# Patient Record
Sex: FEMALE | Race: WHITE | URBAN - METROPOLITAN AREA
[De-identification: names, ages, dates, MRNs, and addresses within clinical notes are randomized per-mention and may not be internally consistent; named-entity substitution may affect disease eponyms.]

---

## 2017-01-17 DIAGNOSIS — Z78.0 POSTMENOPAUSAL ESTROGEN DEFICIENCY: ICD-10-CM

## 2017-01-17 DIAGNOSIS — Z12.39 SCREENING FOR BREAST CANCER: ICD-10-CM

## 2017-02-10 ENCOUNTER — TELEPHONE (OUTPATIENT)
Dept: ENDOCRINOLOGY | Age: 57
End: 2017-02-10

## 2017-02-10 NOTE — TELEPHONE ENCOUNTER
----- Message from Kashif Jovel sent at 2/10/2017 10:28 AM EST -----  Regarding: FW: Dr. Cayla Malone  See attached message. Patient needs a lab order before Monday. Thanks [employer]. ----- Message -----     From: Benjamin Santana     Sent: 2/10/2017   9:21 AM       To: e Front Office Pool  Subject: Dr. Cayla Malone                            Pt sated she sent [employer] message 4 days ago requesting for labwork(A1C) so she can go on Monday , and has not received a response. Pt would like a all. Best contact number [phone].

## 2017-03-31 ENCOUNTER — OFFICE VISIT (OUTPATIENT)
Dept: ENDOCRINOLOGY | Age: 57
End: 2017-03-31

## 2017-03-31 VITALS
SYSTOLIC BLOOD PRESSURE: 110 MMHG | OXYGEN SATURATION: 98 % | BODY MASS INDEX: 18.68 KG/M2 | HEIGHT: 67 IN | WEIGHT: 119 LBS | DIASTOLIC BLOOD PRESSURE: 74 MMHG | HEART RATE: 71 BPM

## 2017-03-31 DIAGNOSIS — E78.5 HYPERLIPIDEMIA LDL GOAL <100: ICD-10-CM

## 2017-03-31 DIAGNOSIS — R73.02 IMPAIRED GLUCOSE TOLERANCE: Primary | ICD-10-CM

## 2017-03-31 LAB — HBA1C MFR BLD HPLC: 5.2 %

## 2017-03-31 RX ORDER — SUVOREXANT 15 MG/1
TABLET, FILM COATED ORAL
COMMUNITY
Start: 2017-03-29 | End: 2017-03-31

## 2017-03-31 RX ORDER — DILTIAZEM HYDROCHLORIDE 30 MG/1
TABLET, FILM COATED ORAL
COMMUNITY
Start: 2017-03-04 | End: 2017-03-31

## 2017-03-31 RX ORDER — METFORMIN HYDROCHLORIDE 500 MG/1
500 TABLET, EXTENDED RELEASE ORAL
Qty: 30 TAB | Refills: 11 | Status: SHIPPED | OUTPATIENT
Start: 2017-03-31 | End: 2018-05-17 | Stop reason: SDUPTHER

## 2017-03-31 RX ORDER — VALACYCLOVIR HYDROCHLORIDE 1 G/1
500 TABLET, FILM COATED ORAL DAILY
COMMUNITY
Start: 2017-03-28

## 2017-03-31 RX ORDER — EAR PLUGS
EACH OTIC (EAR)
COMMUNITY
End: 2017-11-29

## 2017-03-31 NOTE — MR AVS SNAPSHOT
Visit Information Date & Time Provider Department Dept. Phone Encounter #  
 3/31/2017  3:30 PM Serg Lawson [facility] [phone] [id number] Follow-up Instructions Return in about 6 months (around 9/30/2017). Upcoming Health Maintenance Date Due Pneumococcal 19-64 Highest Risk (1 of 3 - PCV13) 4/21/1977 DTaP/Tdap/Td series (1 - Tdap) 4/21/1979 PAP AKA CERVICAL CYTOLOGY 4/21/1979 FOBT Q 1 YEAR AGE 50-75 4/21/2008 BREAST CANCER SCRN MAMMOGRAM 12/8/2018 Allergies as of 3/31/2017  Review Complete On: 3/31/2017 By: Susie Laguna MD  
 No Known Allergies Current Immunizations  Reviewed on 11/29/2016 Name Date Influenza Vaccine (Quad) PF 11/29/2016 Not reviewed this visit You Were Diagnosed With   
  
 Codes Comments Impaired glucose tolerance    -  Primary ICD-10-CM: R73.02 
ICD-9-CM: 790.22 Hyperlipidemia LDL goal <100     ICD-10-CM: E78.5 ICD-9-CM: 272.4 Vitals BP Pulse Height(growth percentile) Weight(growth percentile) SpO2 BMI  
 110/74 (BP 1 Location: Right arm, BP Patient Position: Sitting) 71 5' 7\" (1.702 m) 119 lb (54 kg) 98% 18.64 kg/m2 OB Status Smoking Status Menopause Never Smoker Vitals History BMI and BSA Data Body Mass Index Body Surface Area  
 18.64 kg/m 2 1.6 m 2 Preferred Pharmacy Pharmacy Name Phone [facility] [address] [phone] Your Updated Medication List  
  
   
This list is accurate as of: 3/31/17  5:00 PM.  Always use your most recent med list.  
  
  
  
  
 aspirin 325 mg tablet Commonly known as:  ASPIRIN Take 325 mg by mouth daily. atorvastatin 10 mg tablet Commonly known as:  LIPITOR Take 5 mg by mouth daily. BELSOMRA 5 mg tablet Generic drug:  suvorexant Take  by mouth nightly as needed for Insomnia. calcium carbonate 600 mg calcium (1,500 mg) tablet Commonly known as:  Merline Salle Take 600 mg by mouth daily. Calcium-Vitamin D3-Vitamin K 450-104-77 mg-unit-mcg Chew Take  by mouth. CO Q-10 100 mg capsule Generic drug:  co-enzyme Q-10 Take 100 mg by mouth daily. fish oil-dha-epa 1,200-144-216 mg Cap Take 2 Tabs by mouth daily. Glucosamine &Chondroit-MV-Min3 024-746-33-0.5 mg Tab Take  by mouth.  
  
 magnesium oxide 500 mg Tab Take 500 mg by mouth daily. metFORMIN  mg tablet Commonly known as:  GLUCOPHAGE XR Take 1 Tab by mouth daily (with dinner). multivitamin tablet Commonly known as:  ONE A DAY Take 1 Tab by mouth daily. raNITIdine 300 mg tablet Commonly known as:  ZANTAC Take 300 mg by mouth two (2) times a day. valACYclovir 1 gram tablet Commonly known as:  VALTREX  
500 mg. VITAMIN D3 2,000 unit Tab Generic drug:  cholecalciferol (vitamin D3) Take  by mouth daily. Prescriptions Printed Refills  
 metFORMIN ER (GLUCOPHAGE XR) 500 mg tablet 11 Sig: Take 1 Tab by mouth daily (with dinner). Class: Print Route: Oral  
  
We Performed the Following AMB POC HEMOGLOBIN A1C [79288 CPT(R)] HEMOGLOBIN A1C WITH EAG [08822 CPT(R)] METABOLIC PANEL, BASIC [53954 CPT(R)] NMR LIPOPROFILE [id number] CPT(R)] Follow-up Instructions Return in about 6 months (around 9/30/2017). Patient Instructions 1) Your Hemoglobin A1c (3 month test of blood sugar) is now well within the normal range at 5.2%. 2) If you would like to fill the metformin and take one tab with dinner or after dinner to see if this has any effect on your nerve symptoms, you are welcome to fill this. The most common side effects are nausea, diarrhea and belly pain and do tend to improve within 3-7 days of continuing the dose. 3) Introducing [facility]! Dear Oscar Flores: Thank you for requesting a [employer] account. Our records indicate that you already have an active [employer] account. You can access your account anytime at [email or web address] Did you know that you can access your hospital and ER discharge instructions at any time in [employer]? You can also review all of your test results from your hospital stay or ER visit. Additional Information If you have questions, please visit the Frequently Asked Questions section of the [employer] website at [email or web address] Remember, [employer] is NOT to be used for urgent needs. For medical emergencies, dial 911. Now available from your iPhone and Android! Please provide this summary of care documentation to your next provider. Your primary care clinician is listed as [facility]. If you have any questions after today's visit, please call [phone].

## 2017-03-31 NOTE — PROGRESS NOTES
Chief Complaint   Patient presents with    Diabetes    Other     pcp and pharmacy confirmed      History of Present Illness: Imani Ordonez is a 60 y.o. female here for follow up of diabetes. Weight up 4 lbs since last visit in . She tried taking 1/4 of a 10 mg atorva after last visit for about 3 months to see if this has any effect on the CK but it didn't and it was still in the mid-300s and her LDL went back up so she went back up to 1/2 tab daily. She has stopped the gabapentin as this didn't help the numbness in her feet. She does do some weight training and has been working out as much as 3 times a week and didn't seem to have any difference in her CK on this frequency vs only once a week working out. It turns out to be higher now working out once a week than it had been previously at 3 times a week. After last visit, checked her sugar up to 10 times a day for 3 weeks to see what was happening and started paying attention to her carb intake and was eating as much as 75-80 grams per meal and saw some spikes as high as 180 after a meal that could occur as soon as 30 min after eating. Saw that potatoes and pizza were really spiking her. Started watching her carb intake much more closely and currently is eating about 28 g for breakfast, 17 for a mid morning and afternoon snack and 25-30 for lunch and 26 for dinner and about 15 g for dessert. Despite watching her carb intake has not seen any significant improvement in neuropathy symptoms. We discussed a trial of metformin to see if this would have any effect on her neuropathy and she wants to think about this. She has been told her B12 level was high in the past so probably won't benefit from adding a b-complex vitamin to her regimen. Current Outpatient Prescriptions   Medication Sig    valACYclovir (VALTREX) 1 gram tablet 500 mg.  Calcium-Vitamin D3-Vitamin K 463440-25 mg-unit-mcg chew Take  by mouth.     co-enzyme Q-10 (CO Q-10) 100 mg capsule Take 100 mg by mouth daily.  Glucosamine &Chondroit-MV-Min3 952-157-90-0.5 mg tab Take  by mouth.  raNITIdine (ZANTAC) 300 mg tablet Take 300 mg by mouth two (2) times a day.  multivitamin (ONE A DAY) tablet Take 1 Tab by mouth daily.  aspirin (ASPIRIN) 325 mg tablet Take 325 mg by mouth daily.  magnesium oxide 500 mg tab Take 500 mg by mouth daily.  cholecalciferol, vitamin D3, (VITAMIN D3) 2,000 unit tab Take  by mouth daily.  calcium carbonate (CALTREX) 600 mg (1,500 mg) tablet Take 600 mg by mouth daily.  fish oil-dha-epa 1,200-144-216 mg cap Take 2 Tabs by mouth daily.  atorvastatin (LIPITOR) 10 mg tablet Take 5 mg by mouth daily. No current facility-administered medications for this visit. No Known Allergies     Review of Systems:  - Eyes: no blurry vision or double vision  - Cardiovascular: no chest pain  - Respiratory: no shortness of breath  - Musculoskeletal: (+) feet pain from metatarsalgia  - Neurological: (+) numbness in feet    Physical Examination:  Blood pressure 110/74, pulse 71, height 5' 7\" (1.702 m), weight 119 lb (54 kg), SpO2 98 %. - General: pleasant, no distress, good eye contact   - Neck: no carotid bruits  - Cardiovascular: regular, normal rate, nl s1 and s2, no m/r/g,   - Respiratory: clear bilaterally  - Integumentary: no edema,   - Psychiatric: normal mood and affect    Data Reviewed:   Component      Latest Ref Rng & Units 3/31/2017           4:36 PM   Hemoglobin A1c (POC)      % 5.2       Assessment/Plan:     1. Impaired glucose tolerance: Has had mildly elevated A1c levels since early 2015 when it was 5.9% and then 5.5% in 8/15, 5.9% in 4/16 and 5.5% in 8/16. Had a 3 hour glucose tolerance tolerance test in 11/15 that showed a fasting sugar of 80, 1 hour of 121, 2 hour of 55 and 3 hour of 49. Her A1c was 5.6% at [address] when I met her in 11/16.   It was unclear if she could be having intermittent spikes in her glucose that are contributing to her peripheral neuropathy so she started testing her sugars after eating and did identify spikes so has cut back to 30g of carbs or less per meal and 15-17g for snacks. Her A1c is down to 5.2% in 3/17 by POC and despite this , is still having numbness in her feet. She will consider metformin to see if this may help in any way and gave her a prescription to hold onto though I'm not convinced this will give her much benefit given her A1c is now well within the normal range. - consider Metformin  mg with dinner  - check A1c and bmp prior to next visit      2. Hyperlipidemia LDL goal <100: LDL 68 and  in 11/16 while taking atorva 5 mg and she cut back to 2.5 mg daily to see if this would help her CK and it didn't and her LDL apparently went higher when checked by her cardiologist so she has gone back to 5 mg daily. - cont atorva 5 mg daily  - check NMR lipid profile prior to next visit        We spent 25 minutes of face to face time together and > 50% of the time was spent in counseling regarding management of the conditions above. Patient Instructions   1) Your Hemoglobin A1c (3 month test of blood sugar) is now well within the normal range at 5.2%. 2) If you would like to fill the metformin and take one tab with dinner or after dinner to see if this has any effect on your nerve symptoms, you are welcome to fill this. The most common side effects are nausea, diarrhea and belly pain and do tend to improve within 3-7 days of continuing the dose. 3) Please go to your local [facility] 3-4 days before your next appointment to have your labs drawn. Follow-up Disposition:  Return in about 6 months (around 9/30/2017).     Copy sent to:  Dr. Anna Marie Camacho via [facility]   Dr. Priscille Bosworth in [address] Dr. Ephraim Homans in [place] MD

## 2017-03-31 NOTE — PATIENT INSTRUCTIONS
1) Your Hemoglobin A1c (3 month test of blood sugar) is now well within the normal range at 5.2%. 2) If you would like to fill the metformin and take one tab with dinner or after dinner to see if this has any effect on your nerve symptoms, you are welcome to fill this. The most common side effects are nausea, diarrhea and belly pain and do tend to improve within 3-7 days of continuing the dose. 3) Please go to your local [facility] 3-4 days before your next appointment to have your labs drawn.

## 2017-05-02 ENCOUNTER — OFFICE VISIT (OUTPATIENT)
Dept: NEUROLOGY | Age: 57
End: 2017-05-02

## 2017-05-02 VITALS
OXYGEN SATURATION: 99 % | HEART RATE: 81 BPM | DIASTOLIC BLOOD PRESSURE: 78 MMHG | WEIGHT: 118.7 LBS | BODY MASS INDEX: 18.59 KG/M2 | SYSTOLIC BLOOD PRESSURE: 110 MMHG

## 2017-05-02 DIAGNOSIS — R20.0 NUMBNESS OF FINGERS OF BOTH HANDS: Primary | ICD-10-CM

## 2017-05-02 DIAGNOSIS — R53.1 WEAKNESS: ICD-10-CM

## 2017-05-02 DIAGNOSIS — G47.00 INSOMNIA, UNSPECIFIED TYPE: ICD-10-CM

## 2017-05-02 DIAGNOSIS — G62.9 NEUROPATHY: ICD-10-CM

## 2017-05-02 RX ORDER — DOXEPIN HYDROCHLORIDE 3 MG/1
3 TABLET ORAL DAILY
Qty: 4 TAB | Refills: 0 | Status: SHIPPED | COMMUNITY
Start: 2017-05-02 | End: 2017-09-28

## 2017-05-02 RX ORDER — DOXEPIN HYDROCHLORIDE 6 MG/1
6 TABLET ORAL DAILY
Qty: 4 TAB | Refills: 0 | Status: SHIPPED | COMMUNITY
Start: 2017-05-02 | End: 2017-09-28

## 2017-05-02 NOTE — PROGRESS NOTES
NEUROLOGY NEW PATIENT CONSULTATION    REFERRED BY:  Sudhakar Mckeon MD    CHIEF COMPLAINT:  Bilateral numbness in feet  Numbness in both hands at night  Dropping objects in hands    HISTORY OF PRESENT ILLNESS    HISTORY PROVIDED BY:  Patient      Donte Manuel is a 59 y.o. female who I am asked to see in consultation for numbness in hands and feet and dropping objects. Patient is a . Currently she works for the [employer]. She previously lived in [place] and has recently moved to this area. When she was in [place] she had extensive evaluation for her complaints at [facility]. However, at that time she was only having symptoms in the feet. Today she has a new complaint of numbness in the hands and dropping objects. Dr. Juli Jackson did provide her previous records from her neurologist and her [facility] evaluation. This included MRI of the brain that was normal.  Initial EMG of the legs that showed large fiber neuropathy. Follow-up EMG/NCS of the legs at [facility] that was negative for any large fiber neuropathy. ANS testing that was negative. Skin biopsy that was negative for small fiber neuropathy. Multiple lab tests for neuropathy including Lyme, B12, hemoglobin A1c, TSH, SPEP, LUIS ANTONIO, copper level, B1, vitamin D, and rheumatoid factor. She also had genetic testing for CMT. She also had the [place] paraneoplastic panel and kappa/lambda light chain serum levels drawn, and amyloidosis TTR eval. All of these were negative. Patient reports that her symptoms initially started 2 years ago. She had some numbness and pain in the balls of her feet. She underwent multiple evaluations with orthopedics, neurology, rheumatology, etc.  Between these physicians it was determined that she had the likely diagnosis of small fiber neuropathy secondary to glucose spikes as well as a separate issue of left foot metatarsalgia. 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       She has increased sensitivity in her feet and is hypersensitive. This has been crippling to her. She was told she has dropped metatarsals. She has a pressure point on her feet. She can't exercise, walk, and travel. 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 Thus she now works for the [employer]. She notes the neuropathy and metatarsalgia have a temporal relationship which is concerning to her. She has to wear special shoes with zero heel to remotely function. In regards to the erythromelalgia she has a demarcation of color in the feet. It is affected by temperature. She does not have the associated pain. Therefore she does question if this is an accurate diagnosis. She has had extensive vascular studies which have all been negative for any vascular issue causing the problem. She does get some issues in the hands but not extensively. She does have premature atherosclerosis. She was on a statin and did a trial off of this but didn't see change so she went back on the meds. She has insulin resistance and is preDM. She follows with endocrine and has now changed her diet. Her most recent HgBA1c was 5.2. She also has some slight numbness of the hands and some redness of the hands. She has never been diagnosed with CTS but will wake up with numbness. She is dropping items all day long unless she concentrates on gripping items. She couldn't tolerate amitriptyline due to constipation. She doesn't feel like gabapentin really helped. She is having memory issues. She notes that her memory is not like it used to be, but she is concerned this may be secondary to her not sleeping well. She also has insomnia and has failed meds previously. She has tried melatonin but not in high doses.     PMH  Past Medical History:   Diagnosis Date    Carotid disease, bilateral ([address]     increased carotid intima media thickness    Colon polyps     Coronary artery disease     GERD (gastroesophageal reflux disease)     Herpesviral meningitis 2008    Neuropathy     Osteopenia     PAC (premature atrial contraction)     Pre-diabetes     SV[facility]ra[facility]ricular tachycardia) ([facility])        SH  Social History     Social History    Marital status: SINGLE     Spouse name: N/A    Number of children: N/A    Years of education: N/A     Social History Main Topics    Smoking status: Never Smoker    Smokeless tobacco: Never Used    Alcohol use Yes      Comment: rarely    Drug use: No    Sexual activity: No     Other Topics Concern    None     Social History Narrative    Lives in [place] alone. No children. 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 Likes to care for animals. FH  Family History   Problem Relation Age of Onset    Diabetes Mother 61    Alzheimer Mother     Cancer Father      lung    Heart Disease Father     Stroke Brother        ALLERGIES  No Known Allergies    CURRENT MEDS  Current Outpatient Prescriptions   Medication Sig Dispense Refill    valACYclovir (VALTREX) 1 gram tablet 500 mg.  Calcium-Vitamin D3-Vitamin K 059-483-49 mg-unit-mcg chew Take  by mouth.  co-enzyme Q-10 (CO Q-10) 100 mg capsule Take 100 mg by mouth daily.  Glucosamine &Chondroit-MV-Min3 046-216-11-0.5 mg tab Take  by mouth.  multivitamin (ONE A DAY) tablet Take 1 Tab by mouth daily.  aspirin (ASPIRIN) 325 mg tablet Take 325 mg by mouth daily.  magnesium oxide 500 mg tab Take 500 mg by mouth daily.  cholecalciferol, vitamin D3, (VITAMIN D3) 2,000 unit tab Take  by mouth daily.  calcium carbonate (CALTREX) 600 mg (1,500 mg) tablet Take 600 mg by mouth daily.  fish oil-dha-epa 1,200-144-216 mg cap Take 2 Tabs by mouth daily.  atorvastatin (LIPITOR) 10 mg tablet Take 5 mg by mouth daily.  suvorexant (BELSOMRA) 5 mg tablet Take  by mouth nightly as needed for Insomnia.  metFORMIN ER (GLUCOPHAGE XR) 500 mg tablet Take 1 Tab by mouth daily (with dinner).  30 Tab 11    raNITIdine (ZANTAC) 300 mg tablet Take 300 mg by mouth two (2) times a day. REVIEW OF SYSTEMS:     Y  N       Y  N  Y  N   Y  N  [] [x] AIDS          [] [x] Falls  [x] [] Memory Loss  [] [x]  Shortness of breath  [x] [] Anxiety          [x] [] Fatigue [] [x] Muscle Pain        [x] []  Skipped beats  [] [x] Chest Pain   [x] [] Frequent HA [] [x] Ms Weakness     [] [x]  Snoring  [] [x] Constipation [] [x]Hearing loss [] [x] Nause/Vomiting  [] [x]  Stomach Pain  [] [x] Cough          [] [x]Hepatitis [x] [] Neuropathy         [] [x]  Swallowing difficulty  [] [x] Depression  [] [x]Incontinence [] [x] Poor appetite      [] [x]  Vertigo  [] [x] Diarrhea       [] [x] Joint Pain [] [x] Rash                   [] [x]  Visual disturbances  [] [x] Fainting        [] [x] Leg Swelling [] [x] Ringing ears       [] [x]  Weight changes      []Unable to obtain  ROS due to  []mental status change  []sedated   []intubated          PREVIOUS WORKUP  IMAGING: MRI brain: Negative      LABS  Results for orders placed or performed in visit on 03/31/17   AMB POC HEMOGLOBIN A1C   Result Value Ref Range    Hemoglobin A1c (POC) 5.2 %       PHYSICAL EXAM  Visit Vitals    /78    Pulse 81    Wt 53.8 kg (118 lb 11.2 oz)    SpO2 99%    BMI 18.59 kg/m2     General:  Alert, cooperative, no distress. Head:  Normocephalic, without obvious abnormality, atraumatic. Eyes:  Conjunctivae/corneas clear. Pupils equal, round, reactive to light. Extraocular movements intact, VFF, NO papilledema   Lungs:  Heart:   Non labored breathing  Regular rate and rhythm, no carotid bruits   Abdomen:   Soft, non-distended   Extremities: Extremities normal, atraumatic, no cyanosis or edema. Pulses: 2+ and symmetric all extremities. Skin: Skin color, texture, turgor normal. No rashes or lesions.    Neurologic:  Gen: Attention normal             Language: naming, repetition, fluency normal             Memory: intact recent and remote memory  Cranial Nerves:  I: smell Not tested   II: visual fields Full to confrontation   II: pupils Equal, round, reactive to light   II: optic disc No papilledema   III,VII: ptosis none   III,IV,VI: extraocular muscles  Full ROM   V: mastication normal   V: facial light touch sensation  normal   VII: facial muscle function   symmetric   VIII: hearing symmetric   IX: soft palate elevation  normal   XI: trapezius strength  5/5   XI: sternocleidomastoid strength 5/5   XI: neck flexion strength  5/5   XII: tongue  midline     Motor: normal bulk and tone, no tremor              Strength: 5/5 all four extremities  Sensory: Decreased pinprick in right ankle and decreased proprioception in right great toe otherwise intact to pinprick, vibration, temperature and other extremities  Coordination: FTN intact, Rhomberg positive  Gait: normal gait but unable to tandem  Reflexes: 2+ throughout       [address] Barney Gramajo is a 59 y.o. female who presents for evaluation of numbness in the feet and hands. She has had a previous diagnosis of small fiber neuropathy. I suspect this could be her issue and exam seems to be improving with improvement of her hemoglobin A1c. Encouraged her to continue aggressive blood sugar control and diet. Given her other symptoms of metatarsalgia and erythromelalgia I would like to pursue spinal imaging. Will do an MRI of the cervical and lumbar spine. At this point patient does not want to try any medications for the neuropathy. She is however having significant insomnia, so we will try medication for this. RECOMMENDATIONS  1. MRI C-spine and lumbar spine reviewed all labs from  [address] [place] [address] and the neurology center. These are available in the media tab  3. We will try Silenor samples 3 mg and 6 mg for insomnia  4. No further labs needed at this point  5. Encouraged patient to do CTS exercises and wear wrist splints at night  6.   May need to pursue repeat EMG/NCS and include the arms for evaluation of CTS    FU 3 months    Naeem Cooley MD    CC: Leticia Urrutia MD  Fax: [phone]    Over 60 minutes was spent with the patient of which > 50% of the visit was spent counseling on diagnosis, management, and treatment of the diagnosis neuropathy    This note was created using voice recognition software. Despite editing, there may be syntax errors. This note will not be viewable in [address]

## 2017-05-02 NOTE — MR AVS SNAPSHOT
Visit Information Date & Time Provider Department Dept. Phone Encounter #  
 5/2/2017  2:00 PM Sara Hernández MD Neurology [facility] [place] [facility] [phone] [id number] Your Appointments 9/29/2017  1:30 PM  
Follow Up with MD Nilesh Guzman Diabetes and Endocrinology [facility]) Appt Note: 6 month f/u  
 [address] [facility] [address] [id number] [address] [facility] Upcoming Health Maintenance Date Due DTaP/Tdap/Td series (1 - Tdap) 4/21/1979 PAP AKA CERVICAL CYTOLOGY 4/21/1979 FOBT Q 1 YEAR AGE 50-75 4/21/2008 INFLUENZA AGE 9 TO ADULT 8/1/2017 BREAST CANCER SCRN MAMMOGRAM 12/8/2018 Allergies as of 5/2/2017  Review Complete On: 5/2/2017 By: Pina Roles No Known Allergies Current Immunizations  Reviewed on 11/29/2016 Name Date Influenza Vaccine (Quad) PF 11/29/2016 Not reviewed this visit You Were Diagnosed With   
  
 Codes Comments Numbness of fingers of both hands    -  Primary ICD-10-CM: R20.0 ICD-9-CM: 782.0 Weakness     ICD-10-CM: R53.1 ICD-9-CM: 780.79 Insomnia, unspecified type     ICD-10-CM: G47.00 ICD-9-CM: 780.52 Neuropathy     ICD-10-CM: G62.9 ICD-9-CM: 552. 9 Vitals BP Pulse Weight(growth percentile) SpO2 BMI OB Status 110/78 81 118 lb 11.2 oz (53.8 kg) 99% 18.59 kg/m2 Menopause Smoking Status Never Smoker BMI and BSA Data Body Mass Index Body Surface Area 18.59 kg/m 2 1.59 m 2 Preferred Pharmacy Pharmacy Name Phone [facility] [address] [address] [phone] Your Updated Medication List  
  
   
This list is accurate as of: 5/2/17  3:07 PM.  Always use your most recent med list.  
  
  
  
  
 aspirin 325 mg tablet Commonly known as:  ASPIRIN Take 325 mg by mouth daily. atorvastatin 10 mg tablet Commonly known as:  LIPITOR Take 5 mg by mouth daily. BELSOMRA 5 mg tablet Generic drug:  suvorexant Take  by mouth nightly as needed for Insomnia. calcium carbonate 600 mg calcium (1,500 mg) tablet Commonly known as:  Refugia Priestly Take 600 mg by mouth daily. Calcium-Vitamin D3-Vitamin K 102-242-27 mg-unit-mcg Chew Take  by mouth. CO Q-10 100 mg capsule Generic drug:  co-enzyme Q-10 Take 100 mg by mouth daily. * Doxepin 3 mg Tab Commonly known as:  Adonis Schwab Take 3 mg by mouth daily. * Doxepin 6 mg Tab Commonly known as:  Adonis Schwab Take 6 mg by mouth daily. fish oil-dha-epa 1,200-144-216 mg Cap Take 2 Tabs by mouth daily. Glucosamine &Chondroit-MV-Min3 360-191-80-0.5 mg Tab Take  by mouth.  
  
 magnesium oxide 500 mg Tab Take 500 mg by mouth daily. metFORMIN  mg tablet Commonly known as:  GLUCOPHAGE XR Take 1 Tab by mouth daily (with dinner). multivitamin tablet Commonly known as:  ONE A DAY Take 1 Tab by mouth daily. raNITIdine 300 mg tablet Commonly known as:  ZANTAC Take 300 mg by mouth two (2) times a day. valACYclovir 1 gram tablet Commonly known as:  VALTREX  
500 mg. VITAMIN D3 2,000 unit Tab Generic drug:  cholecalciferol (vitamin D3) Take  by mouth daily. * Notice: This list has 2 medication(s) that are the same as other medications prescribed for you. Read the directions carefully, and ask your doctor or other care provider to review them with you. To-Do List   
 05/03/2017 Imaging:  MRI CERV SPINE W WO CONT   
  
 05/03/2017 Imaging:  MRI LUMB SPINE W WO CONT Patient Instructions A Healthy Lifestyle: Care Instructions Your Care Instructions A healthy lifestyle can help you feel good, stay at a healthy weight, and have plenty of energy for both work and play.  A healthy lifestyle is something you can share with your whole family. A healthy lifestyle also can lower your risk for serious health problems, such as high blood pressure, heart disease, and diabetes. You can follow a few steps listed below to improve your health and the health of your family. Follow-up care is a key part of your treatment and safety. Be sure to make and go to all appointments, and call your doctor if you are having problems. Its also a good idea to know your test results and keep a list of the medicines you take. How can you care for yourself at home? · Do not eat too much sugar, fat, or fast foods. You can still have dessert and treats now and then. The goal is moderation. · Start small to improve your eating habits. Pay attention to portion sizes, drink less juice and soda pop, and eat more fruits and vegetables. ¨ Eat a healthy amount of food. A 3-ounce serving of meat, for example, is about the size of a deck of cards. Fill the rest of your plate with vegetables and whole grains. ¨ Limit the amount of soda and sports drinks you have every day. Drink more water when you are thirsty. ¨ Eat at least 5 servings of fruits and vegetables every day. It may seem like a lot, but it is not hard to reach this goal. A serving or helping is 1 piece of fruit, 1 cup of vegetables, or 2 cups of leafy, raw vegetables. Have an apple or some carrot sticks as an afternoon snack instead of a candy bar. Try to have fruits and/or vegetables at every meal. 
· Make exercise part of your daily routine. You may want to start with simple activities, such as walking, bicycling, or slow swimming. Try to be active 30 to 60 minutes every day. You do not need to do all 30 to 60 minutes all at once. For example, you can exercise 3 times a day for 10 or 20 minutes.  Moderate exercise is safe for most people, but it is always a good idea to talk to your doctor before starting an exercise program. 
 · Keep moving. Brittny Shorten the lawn, work in the garden, or [employer]. Take the stairs instead of the elevator at work. · If you smoke, quit. People who smoke have an increased risk for heart attack, stroke, cancer, and other lung illnesses. Quitting is hard, but there are ways to boost your chance of quitting tobacco for good. ¨ Use nicotine gum, patches, or lozenges. ¨ Ask your doctor about stop-smoking programs and medicines. ¨ Keep trying. In addition to reducing your risk of diseases in the future, you will notice some benefits soon after you stop using tobacco. If you have shortness of breath or asthma symptoms, they will likely get better within a few weeks after you quit. · Limit how much alcohol you drink. Moderate amounts of alcohol (up to 2 drinks a day for men, 1 drink a day for women) are okay. But drinking too much can lead to liver problems, high blood pressure, and other health problems. Family health If you have a family, there are many things you can do together to improve your health. · Eat meals together as a family as often as possible. · Eat healthy foods. This includes fruits, vegetables, lean meats and dairy, and whole grains. · Include your family in your fitness plan. Most people think of activities such as jogging or tennis as the way to fitness, but there are many ways you and your family can be more active. Anything that makes you breathe hard and gets your heart pumping is exercise. Here are some tips: 
¨ Walk to do errands or to take your child to school or the bus. ¨ Go for a family bike ride after dinner instead of watching TV. Where can you learn more? Go to http://dereje-yamilka.info/. Enter A704 in the search box to learn more about \"A Healthy Lifestyle: Care Instructions. \" Current as of: July 26, 2016 Content Version: 11.2 © 4522-6542 [employer].  Care instructions adapted under license by [address] (which disclaims liability or warranty for this information). If you have questions about a medical condition or this instruction, always ask your healthcare professional. [address] any warranty or liability for your use of this information. A Healthy Lifestyle: Care Instructions Your Care Instructions A healthy lifestyle can help you feel good, stay at a healthy weight, and have plenty of energy for both work and play. A healthy lifestyle is something you can share with your whole family. A healthy lifestyle also can lower your risk for serious health problems, such as high blood pressure, heart disease, and diabetes. You can follow a few steps listed below to improve your health and the health of your family. Follow-up care is a key part of your treatment and safety. Be sure to make and go to all appointments, and call your doctor if you are having problems. Its also a good idea to know your test results and keep a list of the medicines you take. How can you care for yourself at home? · Do not eat too much sugar, fat, or fast foods. You can still have dessert and treats now and then. The goal is moderation. · Start small to improve your eating habits. Pay attention to portion sizes, drink less juice and soda pop, and eat more fruits and vegetables. ¨ Eat a healthy amount of food. A 3-ounce serving of meat, for example, is about the size of a deck of cards. Fill the rest of your plate with vegetables and whole grains. ¨ Limit the amount of soda and sports drinks you have every day. Drink more water when you are thirsty. ¨ Eat at least 5 servings of fruits and vegetables every day. It may seem like a lot, but it is not hard to reach this goal. A serving or helping is 1 piece of fruit, 1 cup of vegetables, or 2 cups of leafy, raw vegetables.  Have an apple or some carrot sticks as an afternoon snack instead of a candy bar. Try to have fruits and/or vegetables at every meal. 
· Make exercise part of your daily routine. You may want to start with simple activities, such as walking, bicycling, or slow swimming. Try to be active 30 to 60 minutes every day. You do not need to do all 30 to 60 minutes all at once. For example, you can exercise 3 times a day for 10 or 20 minutes. Moderate exercise is safe for most people, but it is always a good idea to talk to your doctor before starting an exercise program. 
· Keep moving. [place] [employer] the lawn, work in the garden, or [employer]. Take the stairs instead of the elevator at work. · If you smoke, quit. People who smoke have an increased risk for heart attack, stroke, cancer, and other lung illnesses. Quitting is hard, but there are ways to boost your chance of quitting tobacco for good. ¨ Use nicotine gum, patches, or lozenges. ¨ Ask your doctor about stop-smoking programs and medicines. ¨ Keep trying. In addition to reducing your risk of diseases in the future, you will notice some benefits soon after you stop using tobacco. If you have shortness of breath or asthma symptoms, they will likely get better within a few weeks after you quit. · Limit how much alcohol you drink. Moderate amounts of alcohol (up to 2 drinks a day for men, 1 drink a day for women) are okay. But drinking too much can lead to liver problems, high blood pressure, and other health problems. Family health If you have a family, there are many things you can do together to improve your health. · Eat meals together as a family as often as possible. · Eat healthy foods. This includes fruits, vegetables, lean meats and dairy, and whole grains. · Include your family in your fitness plan. Most people think of activities such as jogging or tennis as the way to fitness, but there are many ways you and your family can be more active.  Anything that makes you breathe hard and gets your heart pumping is exercise. Here are some tips: 
¨ Walk to do errands or to take your child to school or the bus. ¨ Go for a family bike ride after dinner instead of watching TV. Where can you learn more? Go to http://dereje-yamilka.info/. Enter X275 in the search box to learn more about \"A Healthy Lifestyle: Care Instructions. \" Current as of: July 26, 2016 Content Version: 11.2 © 7172-2129 [employer]. Care instructions adapted under license by [employer] (which disclaims liability or warranty for this information). If you have questions about a medical condition or this instruction, always ask your healthcare professional. [address] any warranty or liability for your use of this information. Carpal Tunnel Syndrome: Exercises Your Care Instructions Here are some examples of typical rehabilitation exercises for your condition. Start each exercise slowly. Ease off the exercise if you start to have pain. Your doctor or your physical or occupational therapist will tell you when you can start these exercises and which ones will work best for you. How to do the exercises Note: When you no longer have pain or numbness, you can do exercises to help prevent carpal tunnel syndrome from coming back. Do not do any stretch or movement that is uncomfortable or painful. Warm-up stretches 1. Rotate your wrist up, down, and from side to side. Repeat 4 times. 2. Stretch your fingers far apart. Relax them, and then stretch them again. Repeat 4 times. 3. Stretch your thumb by pulling it back gently, holding it, and then releasing it. Repeat 4 times. Prayer stretch 1. Start with your palms together in front of your chest just below your chin. 2. Slowly lower your hands toward your waistline, keeping your hands close to your stomach and your palms together until you feel a mild to moderate stretch under your forearms. 3. Hold for at least 15 to 30 seconds. Repeat 2 to 4 times. Wrist flexor stretch 1. Extend your arm in front of you with your palm up. 2. Bend your wrist, pointing your hand toward the floor. 3. With your other hand, gently bend your wrist farther until you feel a mild to moderate stretch in your forearm. 4. Hold for at least 15 to 30 seconds. Repeat 2 to 4 times. Wrist extensor stretch Repeat steps 1 through 4 of the stretch above, but begin with your extended hand palm down. Follow-up care is a key part of your treatment and safety. Be sure to make and go to all appointments, and call your doctor if you are having problems. It's also a good idea to know your test results and keep a list of the medicines you take. Where can you learn more? Go to http://dereje-yamilka.info/. Enter X112 in the search box to learn more about \"Carpal Tunnel Syndrome: Exercises. \" Current as of: May 23, 2016 Content Version: 11.2 © 6295-1221 [employer]. Care instructions adapted under license by [employer] (which disclaims liability or warranty for this information). If you have questions about a medical condition or this instruction, always ask your healthcare professional. [place] [address] any warranty or liability for your use of this information. Introducing [facility]! Dear Camden Jacobo: Thank you for requesting a [employer] account. Our records indicate that you already have an active [employer] account. You can access your account anytime at [email or web address] Did you know that you can access your hospital and ER discharge instructions at any time in [employer]? You can also review all of your test results from your hospital stay or ER visit. Additional Information If you have questions, please visit the Frequently Asked Questions section of the [employer] website at [email or web address] Remember, MyChart is NOT to be used for urgent needs. For medical emergencies, dial 911. Now available from your iPhone and Android! Please provide this summary of care documentation to your next provider. Your primary care clinician is listed as Prudence Lobe. If you have any questions after today's visit, please call [phone].

## 2017-05-02 NOTE — LETTER
Dear Atiya Headley MD, Thank you for allowing me to see your patient, Tonja Funes for a neurological consultation. Please see my impression and recommendations as outlined in my note. Sincerely, Marizol Wells MD 
[facility] [address] [facility] BY: 
Atiya Headley MD 
 
CHIEF COMPLAINT: 
Bilateral numbness in feet Numbness in both hands at night Dropping objects in hands HISTORY OF PRESENT ILLNESS HISTORY PROVIDED BY: 
Patient Tonja Funes is a 59 y.o. female who I am asked to see in consultation for numbness in hands and feet and dropping objects. Patient is a . Currently she works for the [employer]. She previously lived in [facility] and has recently moved to this area. When she was in [place] she had extensive evaluation for her complaints at [facility]. However, at that time she was only having symptoms in the feet. Today she has a new complaint of numbness in the hands and dropping objects. Dr. Micheal Bob did provide her previous records from her neurologist and her [facility] evaluation. This included MRI of the brain that was normal.  Initial EMG of the legs that showed large fiber neuropathy. Follow-up EMG/NCS of the legs at [facility] that was negative for any large fiber neuropathy. ANS testing that was negative. Skin biopsy that was negative for small fiber neuropathy. Multiple lab tests for neuropathy including Lyme, B12, hemoglobin A1c, TSH, SPEP, LUIS ANTONIO, copper level, B1, vitamin D, and rheumatoid factor. She also had genetic testing for CMT. She also had the [place] paraneoplastic panel and kappa/lambda light chain serum levels drawn, and amyloidosis TTR eval. All of these were negative. Patient reports that her symptoms initially started 2 years ago. She had some numbness and pain in the balls of her feet.   She underwent multiple evaluations with orthopedics, neurology, rheumatology, etc.  Between these physicians it was determined that she had the likely diagnosis of small fiber neuropathy secondary to glucose spikes as well as a separate issue of left foot metatarsalgia. 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 She has increased sensitivity in her feet and is hypersensitive. This has been crippling to her. She was told she has dropped metatarsals. She has a pressure point on her feet. She can't exercise, walk, and travel. 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 Thus she now works for the [employer]. She notes the neuropathy and metatarsalgia have a temporal relationship which is concerning to her. She has to wear special shoes with zero heel to remotely function. In regards to the erythromelalgia she has a demarcation of color in the feet. It is affected by temperature. She does not have the associated pain. Therefore she does question if this is an accurate diagnosis. She has had extensive vascular studies which have all been negative for any vascular issue causing the problem. She does get some issues in the hands but not extensively. She does have premature atherosclerosis. She was on a statin and did a trial off of this but didn't see change so she went back on the meds. She has insulin resistance and is preDM. She follows with endocrine and has now changed her diet. Her most recent HgBA1c was 5.2. She also has some slight numbness of the hands and some redness of the hands. She has never been diagnosed with CTS but will wake up with numbness. She is dropping items all day long unless she concentrates on gripping items. She couldn't tolerate amitriptyline due to constipation. She doesn't feel like gabapentin really helped. She is having memory issues.   She notes that her memory is not like it used to be, but she is concerned this may be secondary to her not sleeping well. She also has insomnia and has failed meds previously. She has tried melatonin but not in high doses. PMH Past Medical History:  
Diagnosis Date  Carotid disease, bilateral (HCC)   
 increased carotid intima media thickness  Colon polyps  Coronary artery disease  GERD (gastroesophageal reflux disease)  Herpesviral meningitis 2008  Neuropathy  Osteopenia  PAC (premature atrial contraction)  Pre-diabetes  SVT (supraventricular tachycardia) (HCC) [address] Social History Social History  Marital status: SINGLE Spouse name: N/A  
 Number of children: N/A  
 Years of education: N/A Social History Main Topics  Smoking status: Never Smoker  Smokeless tobacco: Never Used  Alcohol use Yes Comment: rarely  Drug use: No  
 Sexual activity: No  
 
Other Topics Concern  None Social History Narrative Lives in [place] alone. No children. 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 Likes to care for animals. [facility] Family History Problem Relation Age of Onset  Diabetes Mother 61  Alzheimer Mother  Cancer Father   
  lung  Heart Disease Father  Stroke Brother ALLERGIES No Known Allergies CURRENT MEDS Current Outpatient Prescriptions Medication Sig Dispense Refill  valACYclovir (VALTREX) 1 gram tablet 500 mg.  Calcium-Vitamin D3-Vitamin K 366-380-51 mg-unit-mcg chew Take  by mouth.  co-enzyme Q-10 (CO Q-10) 100 mg capsule Take 100 mg by mouth daily.  Glucosamine &Chondroit-MV-Min3 833-241-94-0.5 mg tab Take  by mouth.  multivitamin (ONE A DAY) tablet Take 1 Tab by mouth daily.  aspirin (ASPIRIN) 325 mg tablet Take 325 mg by mouth daily.  magnesium oxide 500 mg tab Take 500 mg by mouth daily.  cholecalciferol, vitamin D3, (VITAMIN D3) 2,000 unit tab Take  by mouth daily.  calcium carbonate (CALTREX) 600 mg (1,500 mg) tablet Take 600 mg by mouth daily.  fish oil-dha-epa 1,200-144-216 mg cap Take 2 Tabs by mouth daily.  atorvastatin (LIPITOR) 10 mg tablet Take 5 mg by mouth daily.  suvorexant (BELSOMRA) 5 mg tablet Take  by mouth nightly as needed for Insomnia.  metFORMIN ER (GLUCOPHAGE XR) 500 mg tablet Take 1 Tab by mouth daily (with dinner). 30 Tab 11  
 raNITIdine (ZANTAC) 300 mg tablet Take 300 mg by mouth two (2) times a day. REVIEW OF SYSTEMS:  
 
Y  N       Y  N  Y  N   Y  N 
  AIDS            Falls    Memory Loss     Shortness of breath Anxiety            Fatigue   Muscle Pain           Skipped beats Chest Pain     Frequent HA   Ms Weakness        Snoring Constipation  Hearing loss   Nause/Vomiting     Stomach Pain Cough           Hepatitis   Neuropathy            Swallowing difficulty Depression   Incontinence   Poor appetite         Vertigo Diarrhea         Joint Pain   Rash                      Visual disturbances Fainting          Leg Swelling   Ringing ears          Weight changes Unable to obtain  ROS due to  mental status change  sedated   intubated PREVIOUS WORKUP IMAGING: MRI brain: Negative LABS Results for orders placed or performed in visit on 03/31/17 AMB POC HEMOGLOBIN A1C Result Value Ref Range Hemoglobin A1c (POC) 5.2 % PHYSICAL EXAM 
Visit Vitals  /78  Pulse 81  Wt 53.8 kg (118 lb 11.2 oz)  SpO2 99%  BMI 18.59 kg/m2 General:  Alert, cooperative, no distress. Head:  Normocephalic, without obvious abnormality, atraumatic. Eyes:  Conjunctivae/corneas clear. Pupils equal, round, reactive to light. Extraocular movements intact, VFF, NO papilledema Lungs: 
Heart:   Non labored breathing Regular rate and rhythm, no carotid bruits Abdomen:   Soft, non-distended Extremities: Extremities normal, atraumatic, no cyanosis or edema. Pulses: 2+ and symmetric all extremities. Skin: Skin color, texture, turgor normal. No rashes or lesions. Neurologic:  Gen: Attention normal 
           Language: naming, repetition, fluency normal 
           Memory: intact recent and remote memory Cranial Nerves: 
I: smell Not tested II: visual fields Full to confrontation II: pupils Equal, round, reactive to light II: optic disc No papilledema III,VII: ptosis none III,IV,VI: extraocular muscles  Full ROM V: mastication normal  
V: facial light touch sensation  normal  
VII: facial muscle function   symmetric VIII: hearing symmetric IX: soft palate elevation  normal  
XI: trapezius strength  5/5 XI: sternocleidomastoid strength 5/5 XI: neck flexion strength  5/5 XII: tongue  midline Motor: normal bulk and tone, no tremor Strength: 5/5 all four extremities Sensory: Decreased pinprick in right ankle and decreased proprioception in right great toe otherwise intact to pinprick, vibration, temperature and other extremities Coordination: FTN intact, Rhomberg positive Gait: normal gait but unable to tandem Reflexes: 2+ throughout IMPRESSION Connor Bradford is a 59 y.o. female who presents for evaluation of numbness in the feet and hands. She has had a previous diagnosis of small fiber neuropathy. I suspect this could be her issue and exam seems to be improving with improvement of her hemoglobin A1c. Encouraged her to continue aggressive blood sugar control and diet. Given her other symptoms of metatarsalgia and erythromelalgia I would like to pursue spinal imaging. Will do an MRI of the cervical and lumbar spine. At this point patient does not want to try any medications for the neuropathy. She is however having significant insomnia, so we will try medication for this. RECOMMENDATIONS 1. MRI C-spine and lumbar spine reviewed all labs from 2.  [facility] and the neurology center. These are available in the media tab 3. We will try Silenor samples 3 mg and 6 mg for insomnia 4. No further labs needed at this point 5. Encouraged patient to do CTS exercises and wear wrist splints at night 6. May need to pursue repeat EMG/NCS and include the arms for evaluation of CTS 
 
FU 3 months Sanford Starks MD 
 
CC: Lauri Sánchez MD 
Fax: [phone] Over 60 minutes was spent with the patient of which > 50% of the visit was spent counseling on diagnosis, management, and treatment of the diagnosis neuropathy This note was created using voice recognition software. Despite editing, there may be syntax errors. This note will not be viewable in [address]

## 2017-05-02 NOTE — PATIENT INSTRUCTIONS
A Healthy Lifestyle: Care Instructions  Your Care Instructions  A healthy lifestyle can help you feel good, stay at a healthy weight, and have plenty of energy for both work and play. A healthy lifestyle is something you can share with your whole family. A healthy lifestyle also can lower your risk for serious health problems, such as high blood pressure, heart disease, and diabetes. You can follow a few steps listed below to improve your health and the health of your family. Follow-up care is a key part of your treatment and safety. Be sure to make and go to all appointments, and call your doctor if you are having problems. Its also a good idea to know your test results and keep a list of the medicines you take. How can you care for yourself at home? · Do not eat too much sugar, fat, or fast foods. You can still have dessert and treats now and then. The goal is moderation. · Start small to improve your eating habits. Pay attention to portion sizes, drink less juice and soda pop, and eat more fruits and vegetables. ¨ Eat a healthy amount of food. A 3-ounce serving of meat, for example, is about the size of a deck of cards. Fill the rest of your plate with vegetables and whole grains. ¨ Limit the amount of soda and sports drinks you have every day. Drink more water when you are thirsty. ¨ Eat at least 5 servings of fruits and vegetables every day. It may seem like a lot, but it is not hard to reach this goal. A serving or helping is 1 piece of fruit, 1 cup of vegetables, or 2 cups of leafy, raw vegetables. Have an apple or some carrot sticks as an afternoon snack instead of a candy bar. Try to have fruits and/or vegetables at every meal.  · Make exercise part of your daily routine. You may want to start with simple activities, such as walking, bicycling, or slow swimming. Try to be active 30 to 60 minutes every day. You do not need to do all 30 to 60 minutes all at once.  For example, you can exercise 3 times a day for 10 or 20 minutes. Moderate exercise is safe for most people, but it is always a good idea to talk to your doctor before starting an exercise program.  · Keep moving. Uday Enriquez the lawn, work in the garden, or [employer]. Take the stairs instead of the elevator at work. · If you smoke, quit. People who smoke have an increased risk for heart attack, stroke, cancer, and other lung illnesses. Quitting is hard, but there are ways to boost your chance of quitting tobacco for good. ¨ Use nicotine gum, patches, or lozenges. ¨ Ask your doctor about stop-smoking programs and medicines. ¨ Keep trying. In addition to reducing your risk of diseases in the future, you will notice some benefits soon after you stop using tobacco. If you have shortness of breath or asthma symptoms, they will likely get better within a few weeks after you quit. · Limit how much alcohol you drink. Moderate amounts of alcohol (up to 2 drinks a day for men, 1 drink a day for women) are okay. But drinking too much can lead to liver problems, high blood pressure, and other health problems. Family health  If you have a family, there are many things you can do together to improve your health. · Eat meals together as a family as often as possible. · Eat healthy foods. This includes fruits, vegetables, lean meats and dairy, and whole grains. · Include your family in your fitness plan. Most people think of activities such as jogging or tennis as the way to fitness, but there are many ways you and your family can be more active. Anything that makes you breathe hard and gets your heart pumping is exercise. Here are some tips:  ¨ Walk to do errands or to take your child to school or the bus. ¨ Go for a family bike ride after dinner instead of watching TV. Where can you learn more? Go to http://dereje-yamilka.info/. Enter M549 in the search box to learn more about \"A Healthy Lifestyle: Care Instructions. \"  Current as of: July 26, 2016  Content Version: 11.2  © 8410-9518 [employer]. Care instructions adapted under license by [employer] (which disclaims liability or warranty for this information). If you have questions about a medical condition or this instruction, always ask your healthcare professional. [address] any warranty or liability for your use of this information. A Healthy Lifestyle: Care Instructions  Your Care Instructions  A healthy lifestyle can help you feel good, stay at a healthy weight, and have plenty of energy for both work and play. A healthy lifestyle is something you can share with your whole family. A healthy lifestyle also can lower your risk for serious health problems, such as high blood pressure, heart disease, and diabetes. You can follow a few steps listed below to improve your health and the health of your family. Follow-up care is a key part of your treatment and safety. Be sure to make and go to all appointments, and call your doctor if you are having problems. Its also a good idea to know your test results and keep a list of the medicines you take. How can you care for yourself at home? · Do not eat too much sugar, fat, or fast foods. You can still have dessert and treats now and then. The goal is moderation. · Start small to improve your eating habits. Pay attention to portion sizes, drink less juice and soda pop, and eat more fruits and vegetables. ¨ Eat a healthy amount of food. A 3-ounce serving of meat, for example, is about the size of a deck of cards. Fill the rest of your plate with vegetables and whole grains. ¨ Limit the amount of soda and sports drinks you have every day. Drink more water when you are thirsty. ¨ Eat at least 5 servings of fruits and vegetables every day.  It may seem like a lot, but it is not hard to reach this goal. A serving or helping is 1 piece of fruit, 1 cup of vegetables, or 2 cups of leafy, raw vegetables. Have an apple or some carrot sticks as an afternoon snack instead of a candy bar. Try to have fruits and/or vegetables at every meal.  · Make exercise part of your daily routine. You may want to start with simple activities, such as walking, bicycling, or slow swimming. Try to be active 30 to 60 minutes every day. You do not need to do all 30 to 60 minutes all at once. For example, you can exercise 3 times a day for 10 or 20 minutes. Moderate exercise is safe for most people, but it is always a good idea to talk to your doctor before starting an exercise program.  · Keep moving. [employer] the lawn, work in the garden, or [email or web address]. Take the stairs instead of the elevator at work. · If you smoke, quit. People who smoke have an increased risk for heart attack, stroke, cancer, and other lung illnesses. Quitting is hard, but there are ways to boost your chance of quitting tobacco for good. ¨ Use nicotine gum, patches, or lozenges. ¨ Ask your doctor about stop-smoking programs and medicines. ¨ Keep trying. In addition to reducing your risk of diseases in the future, you will notice some benefits soon after you stop using tobacco. If you have shortness of breath or asthma symptoms, they will likely get better within a few weeks after you quit. · Limit how much alcohol you drink. Moderate amounts of alcohol (up to 2 drinks a day for men, 1 drink a day for women) are okay. But drinking too much can lead to liver problems, high blood pressure, and other health problems. Family health  If you have a family, there are many things you can do together to improve your health. · Eat meals together as a family as often as possible. · Eat healthy foods. This includes fruits, vegetables, lean meats and dairy, and whole grains. · Include your family in your fitness plan.  Most people think of activities such as jogging or tennis as the way to fitness, but there are many ways you and your family can be more active. Anything that makes you breathe hard and gets your heart pumping is exercise. Here are some tips:  ¨ Walk to do errands or to take your child to school or the bus. ¨ Go for a family bike ride after dinner instead of watching TV. Where can you learn more? Go to http://dereje-yamilka.info/. Enter Y088 in the search box to learn more about \"A Healthy Lifestyle: Care Instructions. \"  Current as of: July 26, 2016  Content Version: 11.2  © 5416-6656 [employer]. Care instructions adapted under license by [employer] (which disclaims liability or warranty for this information). If you have questions about a medical condition or this instruction, always ask your healthcare professional. [address] any warranty or liability for your use of this information. Carpal Tunnel Syndrome: Exercises  Your Care Instructions  Here are some examples of typical rehabilitation exercises for your condition. Start each exercise slowly. Ease off the exercise if you start to have pain. Your doctor or your physical or occupational therapist will tell you when you can start these exercises and which ones will work best for you. How to do the exercises  Note: When you no longer have pain or numbness, you can do exercises to help prevent carpal tunnel syndrome from coming back. Do not do any stretch or movement that is uncomfortable or painful. Warm-up stretches  1. Rotate your wrist up, down, and from side to side. Repeat 4 times. 2. Stretch your fingers far apart. Relax them, and then stretch them again. Repeat 4 times. 3. Stretch your thumb by pulling it back gently, holding it, and then releasing it. Repeat 4 times. Prayer stretch    1. Start with your palms together in front of your chest just below your chin.   2. Slowly lower your hands toward your waistline, keeping your hands close to your stomach and your palms together until you feel a mild to moderate stretch under your forearms. 3. Hold for at least 15 to 30 seconds. Repeat 2 to 4 times. Wrist flexor stretch    1. Extend your arm in front of you with your palm up. 2. Bend your wrist, pointing your hand toward the floor. 3. With your other hand, gently bend your wrist farther until you feel a mild to moderate stretch in your forearm. 4. Hold for at least 15 to 30 seconds. Repeat 2 to 4 times. Wrist extensor stretch    Repeat steps 1 through 4 of the stretch above, but begin with your extended hand palm down. Follow-up care is a key part of your treatment and safety. Be sure to make and go to all appointments, and call your doctor if you are having problems. It's also a good idea to know your test results and keep a list of the medicines you take. Where can you learn more? Go to http://dereje-yamilka.info/. Enter Z604 in the search box to learn more about \"Carpal Tunnel Syndrome: Exercises. \"  Current as of: May 23, 2016  Content Version: 11.2  © 4062-3085 [employer]. Care instructions adapted under license by [employer] (which disclaims liability or warranty for this information). If you have questions about a medical condition or this instruction, always ask your healthcare professional. [address] any warranty or liability for your use of this information.

## 2017-06-22 ENCOUNTER — DOCUMENTATION ONLY (OUTPATIENT)
Dept: NEUROLOGY | Age: 57
End: 2017-06-22

## 2017-06-22 RX ORDER — DOXEPIN HYDROCHLORIDE 10 MG/1
10 CAPSULE ORAL
Qty: 30 CAP | Refills: 5 | Status: SHIPPED | OUTPATIENT
Start: 2017-06-22 | End: 2017-09-28

## 2017-06-22 NOTE — PROGRESS NOTES
Called patient and discussed her MRI results. MRI of the lumbar spine showed the patient has a disc at L4/5 causing moderate central stenosis with inflammatory changes and a disc bulge with nerve root compression on the left. There is also some facet arthropathy at L5-S1 with enhancement suggesting instability with inflammation. Discussed with patient that we could have her see neurosurgery. Patient is hesitant as she mostly only has numbness and no sciatic pain or weakness. We again discussed patient's history of metatarsalgia and erythromelalgia. Patient did have an MRI of her foot that was nondiagnostic. Patient's numbness in her hands has improved so she is waiting on the MRI of the cervical spine. She is using the response at night for this. We discussed the value of repeating any of her prior testing but will wait on this for now. If patient decides she wants to have a neurosurgery referral will send her to see Dr. Yue Bautista. Patient also notes that the [address] did not help her with sleeping. She would like to try higher dose of doxepin so we will send this in today.     Bernice Johnson MD  6/22/2017

## 2017-07-11 DIAGNOSIS — G62.9 NEUROPATHY: ICD-10-CM

## 2017-07-11 DIAGNOSIS — R53.1 WEAKNESS: ICD-10-CM

## 2017-07-11 DIAGNOSIS — R20.0 NUMBNESS OF FINGERS OF BOTH HANDS: ICD-10-CM

## 2017-09-07 DIAGNOSIS — R73.02 IMPAIRED GLUCOSE TOLERANCE: Primary | ICD-10-CM

## 2017-09-07 DIAGNOSIS — E78.5 HYPERLIPIDEMIA LDL GOAL <100: ICD-10-CM

## 2017-09-07 DIAGNOSIS — R74.8 ELEVATED CK: ICD-10-CM

## 2017-09-08 RX ORDER — ACARBOSE 25 MG/1
TABLET ORAL
Qty: 30 TAB | Refills: 11 | Status: SHIPPED | OUTPATIENT
Start: 2017-09-08 | End: 2017-09-28

## 2017-09-12 LAB
25(OH)D3+25(OH)D2 SERPL-MCNC: 57.6 NG/ML (ref 30–100)
BUN SERPL-MCNC: 25 MG/DL (ref 6–24)
BUN/CREAT SERPL: 31 (ref 9–23)
CALCIUM SERPL-MCNC: 9.8 MG/DL (ref 8.7–10.2)
CHLORIDE SERPL-SCNC: 104 MMOL/L (ref 96–106)
CHOLEST SERPL-MCNC: 173 MG/DL (ref 100–199)
CK SERPL-CCNC: 387 U/L (ref 24–173)
CO2 SERPL-SCNC: 28 MMOL/L (ref 18–29)
CREAT SERPL-MCNC: 0.8 MG/DL (ref 0.57–1)
ERYTHROCYTE [DISTWIDTH] IN BLOOD BY AUTOMATED COUNT: 14 % (ref 12.3–15.4)
EST. AVERAGE GLUCOSE BLD GHB EST-MCNC: 108 MG/DL
GLUCOSE SERPL-MCNC: 76 MG/DL (ref 65–99)
HBA1C MFR BLD: 5.4 % (ref 4.8–5.6)
HCT VFR BLD AUTO: 41 % (ref 34–46.6)
HDL SERPL-SCNC: 42.9 UMOL/L
HDLC SERPL-MCNC: 82 MG/DL
HGB BLD-MCNC: 13.9 G/DL (ref 11.1–15.9)
INTERPRETATION, 910389: NORMAL
LDL SERPL QN: 21.7 NM
LDL SERPL-SCNC: 813 NMOL/L
LDL SMALL SERPL-SCNC: <90 NMOL/L
LDLC SERPL CALC-MCNC: 79 MG/DL (ref 0–99)
LP-IR SCORE SERPL: <25
MCH RBC QN AUTO: 30.3 PG (ref 26.6–33)
MCHC RBC AUTO-ENTMCNC: 33.9 G/DL (ref 31.5–35.7)
MCV RBC AUTO: 89 FL (ref 79–97)
PLATELET # BLD AUTO: 274 X10E3/UL (ref 150–379)
POTASSIUM SERPL-SCNC: 4.7 MMOL/L (ref 3.5–5.2)
RBC # BLD AUTO: 4.59 X10E6/UL (ref 3.77–5.28)
SODIUM SERPL-SCNC: 145 MMOL/L (ref 134–144)
TRIGL SERPL-MCNC: 62 MG/DL (ref 0–149)
TSH SERPL DL<=0.005 MIU/L-ACNC: 2.51 UIU/ML (ref 0.45–4.5)
WBC # BLD AUTO: 4.9 X10E3/UL (ref 3.4–10.8)

## 2017-09-15 ENCOUNTER — OFFICE VISIT (OUTPATIENT)
Dept: NEUROLOGY | Age: 57
End: 2017-09-15

## 2017-09-15 VITALS — HEART RATE: 70 BPM | SYSTOLIC BLOOD PRESSURE: 100 MMHG | DIASTOLIC BLOOD PRESSURE: 65 MMHG | OXYGEN SATURATION: 95 %

## 2017-09-15 DIAGNOSIS — G62.9 SMALL FIBER NEUROPATHY: Primary | ICD-10-CM

## 2017-09-15 DIAGNOSIS — M62.81 MUSCLE WEAKNESS: ICD-10-CM

## 2017-09-15 DIAGNOSIS — R74.8 ABNORMAL CK: ICD-10-CM

## 2017-09-15 RX ORDER — PREGABALIN 75 MG/1
75 CAPSULE ORAL 2 TIMES DAILY
Qty: 21 CAP | Refills: 0 | Status: SHIPPED | COMMUNITY
Start: 2017-09-15 | End: 2017-09-28 | Stop reason: SDUPTHER

## 2017-09-15 NOTE — LETTER
Patient reports no changes in her peripheral neuropathy. She reports numbness to bilateral feet and pain in the ball of her left foot with weight bearing. She has been taking Lyrica and it has not helped the neuropathy but it helping her sleep. Neurology Progress Note Patient ID: 
Earline Sherman [id number] 
7 y.o. 
1960 HISTORY PROVIDED BY: 
Patient Chief Complaint: Neuropathy Subjective:  
 Ms. Erick James is here for follow up today of suspected small fiber neuropathy secondary to prediabetes. 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 She was seen by Dr. Fredo Ng the podiatrist in [place]. It was recommended that she try Lyrica. Patient had previously been prescribed this but has not tried it. She started on the Lyrica and noticed that it did seem to help her with sleeping but does not seem to have any effect on her feet. Patient continues to have numbness in the feet as well as pain from the metatarsalgia. She feels like the Lyrica at 100 mg twice a day was too sedating. She changed to 100 mg just at night. With this dose she does have some cognitive issues the following day, but is sleeping well. She would like to adjust this further if possible. Since last visit patient has also had repeated blood work. This showed an elevated CK. We discussed this in detail today. Patient reports her level has run in the 300s despite being on or off a statin medication. She is also currently not on any type of PPI. Patient reports that she does have some muscle fatigue especially in the morning and especially with walking up steps. Patient is also concerned about possible autonomic insufficiency. Due to the changes in temperature in her hands and feet. She also had a low reading on her oxygen monitor date 95% but this could have been related to her temperature changes in the hands. Patient has had previous workup at [facility] as detailed in my previous note. She has had prior testing with an EMG/NCS. We discussed that she has not had autonomic testing at this point. We also reviewed her labs and will order several other tests today. Since last visit she did have an MRI of the lumbar spine. This showed moderate central stenosis at L4/5. At this time patient's symptoms do not seem related to the stenosis. Plan is to hold on any type of neurosurgery referral. 
 
Objective:  
ROS: 
Per HPI- 
Otherwise 12 point ROS was negative Meds: 
Current Outpatient Prescriptions on File Prior to Visit Medication Sig Dispense Refill  acarbose (PRECOSE) 25 mg tablet Take 1/2 tab 5-10 minutes before lunch and may increase to a whole tab as needed to keep after meal readings under 140 30 Tab 11  
 valACYclovir (VALTREX) 1 gram tablet 500 mg.  Calcium-Vitamin D3-Vitamin K 423-990-05 mg-unit-mcg chew Take  by mouth.  metFORMIN ER (GLUCOPHAGE XR) 500 mg tablet Take 1 Tab by mouth daily (with dinner). 30 Tab 11  
 co-enzyme Q-10 (CO Q-10) 100 mg capsule Take 100 mg by mouth daily.  Glucosamine &Chondroit-MV-Min3 040-644-93-0.5 mg tab Take  by mouth.  raNITIdine (ZANTAC) 300 mg tablet Take 300 mg by mouth two (2) times a day.  multivitamin (ONE A DAY) tablet Take 1 Tab by mouth daily.  aspirin (ASPIRIN) 325 mg tablet Take 325 mg by mouth daily.  magnesium oxide 500 mg tab Take 500 mg by mouth daily.  cholecalciferol, vitamin D3, (VITAMIN D3) 2,000 unit tab Take  by mouth daily.  calcium carbonate (CALTREX) 600 mg (1,500 mg) tablet Take 600 mg by mouth daily.  fish oil-dha-epa 1,200-144-216 mg cap Take 2 Tabs by mouth daily.  atorvastatin (LIPITOR) 10 mg tablet Take 5 mg by mouth daily.  doxepin (SINEQUAN) 10 mg capsule Take 1 Cap by mouth nightly. 30 Cap 5  Doxepin (SILENOR) 3 mg tab Take 3 mg by mouth daily.  4 Tab 0  
  Doxepin (SILENOR) 6 mg tab Take 6 mg by mouth daily. 4 Tab 0  
 suvorexant (BELSOMRA) 5 mg tablet Take  by mouth nightly as needed for Insomnia. No current facility-administered medications on file prior to visit. Imaging: MRI L-spine: Moderate central stenosis at L4/5 with diffuse disc bulging centrally and towards the left with nerve root compression in the region of the foramen. There is also facet arthropathy at L5-S1. Reviewed records in [facility] and [facility] tab today Lab Review Results for orders placed or performed in visit on 09/07/17 HEMOGLOBIN A1C WITH EAG Result Value Ref Range Hemoglobin A1c 5.4 4.8 - 5.6 % Estimated average glucose 108 mg/dL CK Result Value Ref Range Creatine Kinase,Total 387 (H) 24 - 353 U/L  
METABOLIC PANEL, BASIC Result Value Ref Range Glucose 76 65 - 99 mg/dL BUN 25 (H) 6 - 24 mg/dL Creatinine 0.80 0.57 - 1.00 mg/dL GFR est non-AA 81 >59 mL/min/1.73 GFR est AA 93 >59 mL/min/1.73  
 BUN/Creatinine ratio 31 (H) 9 - 23 Sodium 145 (H) 134 - 144 mmol/L Potassium 4.7 3.5 - 5.2 mmol/L Chloride 104 96 - 106 mmol/L  
 CO2 28 18 - 29 mmol/L Calcium 9.8 8.7 - 10.2 mg/dL TSH 3RD GENERATION Result Value Ref Range TSH 2.510 0.450 - 4.500 uIU/mL VITAMIN D, 25 HYDROXY Result Value Ref Range VITAMIN D, 25-HYDROXY 57.6 30.0 - 100.0 ng/mL CBC W/O DIFF Result Value Ref Range WBC 4.9 3.4 - 10.8 x10E3/uL  
 RBC 4.59 3.77 - 5.28 x10E6/uL HGB 13.9 11.1 - 15.9 g/dL HCT 41.0 34.0 - 46.6 % MCV 89 79 - 97 fL  
 MCH 30.3 26.6 - 33.0 pg  
 MCHC 33.9 31.5 - 35.7 g/dL  
 RDW 14.0 12.3 - 15.4 % PLATELET 624 004 - 072 x10E3/uL NMR LIPOPROFILE Result Value Ref Range LDL-P 813 <1000 nmol/L  
 LDL-C 79 0 - 99 mg/dL HDL-C 82 >39 mg/dL Triglycerides 62 0 - 149 mg/dL Cholesterol, Total 173 100 - 199 mg/dL HDL-P (Total) 42.9 >=30.5 umol/L  Small LDL-P <90 <=527 nmol/L  
 LDL size 21.7 >20.5 nm  
 LP-IR SCORE <25 <=45 CVD REPORT Result Value Ref Range INTERPRETATION Note Exam: 
Visit Vitals  /65  Pulse 70  SpO2 95% Gen: Well developed CV: RRR Lungs: non labored breathing Abd: non distending Neuro: A&O x 3, no dysarthria or aphasia CN II-XII: PERRL, EOMI, face symmetric, tongue/palate midline Motor: strength 5/5 all four ext Sensory: intact to LT Gait: normal 
 
Assessment:  
Raisa Salcido is a 59 y.o. female who presents for follow up of small fiber neuropathy. Today we also discussed patient's symptoms again. Reviewed previous workup again. She does have elevated CK chronically. This has had some evaluation but not thoroughly. Will reevaluate this with labs and repeat EMG/NCS. Patient may need a muscle biopsy but will wait on the above studies first. 
She is getting some benefit from Lyrica in terms of sleep so will continue with this. Discussed patient's case with Dr. Teja Cruz, our neuromuscular specialist, and he will perform her EMG/NCS and autonomic testing. Plan: 1. MRI of the lumbar spine as above. No referrals needed at this time. 2.  EMG/NCS to evaluate for myopathy 3. Will check myasthenia gravis panel, CK, aldolase, LDH, thyroglobulin antibodies 4. May need muscle biopsy pending above studies but at this point I am not sure that it will  5. We will do autonomic testing when available 6. We will try to decrease Lyrica to 75 mg nightly and then to 50 mg nightly. Patient was given samples for 75 mg. She will call us in a week to let us know what dose of prescription she will need further Follow-up after EMG/NCS Signed: 
Leandra Boyer MD 
9/15/2017 This note was created using voice recognition software. Despite editing, there may be syntax errors. This note will not be viewable in [address]

## 2017-09-15 NOTE — PROGRESS NOTES
Neurology Progress Note    Patient ID:  Ashok Ortega  [id number]  88 y.o.  1960    HISTORY PROVIDED BY:  Patient      Chief Complaint: Neuropathy  Subjective:    Ms. Vinetta Lesches is here for follow up today of suspected small fiber neuropathy secondary to prediabetes. 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 She was seen by Dr. Brandon Parks the podiatrist in [place]. It was recommended that she try Lyrica. Patient had previously been prescribed this but has not tried it. She started on the Lyrica and noticed that it did seem to help her with sleeping but does not seem to have any effect on her feet. Patient continues to have numbness in the feet as well as pain from the metatarsalgia. She feels like the Lyrica at 100 mg twice a day was too sedating. She changed to 100 mg just at night. With this dose she does have some cognitive issues the following day, but is sleeping well. She would like to adjust this further if possible. Since last visit patient has also had repeated blood work. This showed an elevated CK. We discussed this in detail today. Patient reports her level has run in the 300s despite being on or off a statin medication. She is also currently not on any type of PPI. Patient reports that she does have some muscle fatigue especially in the morning and especially with walking up steps. Patient is also concerned about possible autonomic insufficiency. Due to the changes in temperature in her hands and feet. She also had a low reading on her oxygen monitor date 95% but this could have been related to her temperature changes in the hands. Patient has had previous workup at [facility] as detailed in my previous note. She has had prior testing with an EMG/NCS. We discussed that she has not had autonomic testing at this point. We also reviewed her labs and will order several other tests today.     Since last visit she did have an MRI of the lumbar spine. This showed moderate central stenosis at L4/5. At this time patient's symptoms do not seem related to the stenosis. Plan is to hold on any type of neurosurgery referral.    Objective:   ROS:  Per HPI-  Otherwise 12 point ROS was negative    Meds:  Current Outpatient Prescriptions on File Prior to Visit   Medication Sig Dispense Refill    acarbose (PRECOSE) 25 mg tablet Take 1/2 tab 5-10 minutes before lunch and may increase to a whole tab as needed to keep after meal readings under 140 30 Tab 11    valACYclovir (VALTREX) 1 gram tablet 500 mg.  Calcium-Vitamin D3-Vitamin K 253-587-65 mg-unit-mcg chew Take  by mouth.  metFORMIN ER (GLUCOPHAGE XR) 500 mg tablet Take 1 Tab by mouth daily (with dinner). 30 Tab 11    co-enzyme Q-10 (CO Q-10) 100 mg capsule Take 100 mg by mouth daily.  Glucosamine &Chondroit-MV-Min3 482-751-41-0.5 mg tab Take  by mouth.  raNITIdine (ZANTAC) 300 mg tablet Take 300 mg by mouth two (2) times a day.  multivitamin (ONE A DAY) tablet Take 1 Tab by mouth daily.  aspirin (ASPIRIN) 325 mg tablet Take 325 mg by mouth daily.  magnesium oxide 500 mg tab Take 500 mg by mouth daily.  cholecalciferol, vitamin D3, (VITAMIN D3) 2,000 unit tab Take  by mouth daily.  calcium carbonate (CALTREX) 600 mg (1,500 mg) tablet Take 600 mg by mouth daily.  fish oil-dha-epa 1,200-144-216 mg cap Take 2 Tabs by mouth daily.  atorvastatin (LIPITOR) 10 mg tablet Take 5 mg by mouth daily.  doxepin (SINEQUAN) 10 mg capsule Take 1 Cap by mouth nightly. 30 Cap 5    Doxepin (SILENOR) 3 mg tab Take 3 mg by mouth daily. 4 Tab 0    Doxepin (SILENOR) 6 mg tab Take 6 mg by mouth daily. 4 Tab 0    suvorexant (BELSOMRA) 5 mg tablet Take  by mouth nightly as needed for Insomnia. No current facility-administered medications on file prior to visit. Imaging:  MRI L-spine:  Moderate central stenosis at L4/5 with diffuse disc bulging centrally and towards the left with nerve root compression in the region of the foramen. There is also facet arthropathy at L5-S1.     Reviewed records in [employer] [facility] [employer] tab today    Lab Review   Results for orders placed or performed in visit on 09/07/17   HEMOGLOBIN A1C WITH EAG   Result Value Ref Range    Hemoglobin A1c 5.4 4.8 - 5.6 %    Estimated average glucose 108 mg/dL   CK   Result Value Ref Range    Creatine Kinase,Total 387 (H) 24 - 595 U/L   METABOLIC PANEL, BASIC   Result Value Ref Range    Glucose 76 65 - 99 mg/dL    BUN 25 (H) 6 - 24 mg/dL    Creatinine 0.80 0.57 - 1.00 mg/dL    GFR est non-AA 81 >59 mL/min/1.73    GFR est AA 93 >59 mL/min/1.73    BUN/Creatinine ratio 31 (H) 9 - 23    Sodium 145 (H) 134 - 144 mmol/L    Potassium 4.7 3.5 - 5.2 mmol/L    Chloride 104 96 - 106 mmol/L    CO2 28 18 - 29 mmol/L    Calcium 9.8 8.7 - 10.2 mg/dL   TSH 3RD GENERATION   Result Value Ref Range    TSH 2.510 0.450 - 4.500 uIU/mL   VITAMIN D, 25 HYDROXY   Result Value Ref Range    VITAMIN D, 25-HYDROXY 57.6 30.0 - 100.0 ng/mL   CBC W/O DIFF   Result Value Ref Range    WBC 4.9 3.4 - 10.8 x10E3/uL    RBC 4.59 3.77 - 5.28 x10E6/uL    HGB 13.9 11.1 - 15.9 g/dL    HCT 41.0 34.0 - 46.6 %    MCV 89 79 - 97 fL    MCH 30.3 26.6 - 33.0 pg    MCHC 33.9 31.5 - 35.7 g/dL    RDW 14.0 12.3 - 15.4 %    PLATELET 717 799 - 430 x10E3/uL   NMR LIPOPROFILE   Result Value Ref Range    LDL-P 813 <1000 nmol/L    LDL-C 79 0 - 99 mg/dL    HDL-C 82 >39 mg/dL    Triglycerides 62 0 - 149 mg/dL    Cholesterol, Total 173 100 - 199 mg/dL    HDL-P (Total) 42.9 >=30.5 umol/L    Small LDL-P <90 <=527 nmol/L    LDL size 21.7 >20.5 nm    LP-IR SCORE <25 <=45   CVD REPORT   Result Value Ref Range    INTERPRETATION Note        Exam:  Visit Vitals    /65    Pulse 70    SpO2 95%     Gen: Well developed  CV: RRR  Lungs: non labored breathing  Abd: non distending  Neuro: A&O x 3, no dysarthria or aphasia  CN II-XII: PERRL, EOMI, face symmetric, tongue/palate midline  Motor: strength 5/5 all four ext  Sensory: intact to LT  Gait: normal    Assessment:   Nay Valverde is a 59 y.o. female who presents for follow up of small fiber neuropathy. Today we also discussed patient's symptoms again. Reviewed previous workup again. She does have elevated CK chronically. This has had some evaluation but not thoroughly. Will reevaluate this with labs and repeat EMG/NCS. Patient may need a muscle biopsy but will wait on the above studies first.  She is getting some benefit from Lyrica in terms of sleep so will continue with this. Discussed patient's case with Dr. Ramón Torres, our neuromuscular specialist, and he will perform her EMG/NCS and autonomic testing. Plan:   1. MRI of the lumbar spine as above. No referrals needed at this time. 2.  EMG/NCS to evaluate for myopathy  3. Will check myasthenia gravis panel, CK, aldolase, LDH, thyroglobulin antibodies  4. May need muscle biopsy pending above studies but at this point I am not sure that it will   5. We will do autonomic testing when available  6. We will try to decrease Lyrica to 75 mg nightly and then to 50 mg nightly. Patient was given samples for 75 mg. She will call us in a week to let us know what dose of prescription she will need further    Follow-up after EMG/NCS    Signed:  Sundar De MD  9/15/2017    This note was created using voice recognition software. Despite editing, there may be syntax errors. This note will not be viewable in [address]

## 2017-09-15 NOTE — PROGRESS NOTES
Patient reports no changes in her peripheral neuropathy. She reports numbness to bilateral feet and pain in the ball of her left foot with weight bearing. She has been taking Lyrica and it has not helped the neuropathy but it helping her sleep.

## 2017-09-15 NOTE — MR AVS SNAPSHOT
Visit Information Date & Time Provider Department Dept. Phone Encounter #  
 9/15/2017 11:40 AM MD Kyle Shaw Neurology [facility] [phone] [id number] Your Appointments 9/29/2017  1:30 PM  
Follow Up with MD Nilesh [facility] [address]) Appt Note: 6 month f/u  
 [address] [facility] [phone] [id number] [address] [place] [address] Road  
  
    
 10/2/2017 11:00 AM  
PROCEDURE with EMG SCHEDULE 1991 [facility] ([address]) Appt Note: EMG/NCS BLE  
 [place] [address] [facility] [address] [facility] [id number] [phone]  
  
   
 [place] [address] [id number] [address] Upcoming Health Maintenance Date Due DTaP/Tdap/Td series (1 - Tdap) 4/21/1979 PAP AKA CERVICAL CYTOLOGY 4/21/1979 FOBT Q 1 YEAR AGE 50-75 4/21/2008 INFLUENZA AGE 9 TO ADULT 8/1/2017 BREAST CANCER SCRN MAMMOGRAM 12/8/2018 Allergies as of 9/15/2017  Review Complete On: 9/15/2017 By: Emiliano Cisneros No Known Allergies Current Immunizations  Reviewed on 11/29/2016 Name Date Influenza Vaccine (Quad) PF 11/29/2016 Not reviewed this visit You Were Diagnosed With   
  
 Codes Comments Abnormal CK    -  Primary ICD-10-CM: R74.8 ICD-9-CM: 790.5 Muscle weakness     ICD-10-CM: M62.81 ICD-9-CM: 728.87 Small fiber neuropathy (HCC)     ICD-10-CM: G62.9 ICD-9-CM: 356.9 Vitals BP Pulse SpO2 OB Status Smoking Status 100/65 70 95% Menopause Never Smoker Preferred Pharmacy Pharmacy Name Phone [facility] [address]pp Mathur, [address] [phone] Your Updated Medication List  
  
   
This list is accurate as of: 9/15/17 12:45 PM.  Always use your most recent med list.  
  
  
  
  
 acarbose 25 mg tablet Commonly known as:  PRECOSE Take 1/2 tab 5-10 minutes before lunch and may increase to a whole tab as needed to keep after meal readings under 140  
  
 aspirin 325 mg tablet Commonly known as:  ASPIRIN Take 325 mg by mouth daily. atorvastatin 10 mg tablet Commonly known as:  LIPITOR Take 5 mg by mouth daily. BELSOMRA 5 mg tablet Generic drug:  suvorexant Take  by mouth nightly as needed for Insomnia. calcium carbonate 600 mg calcium (1,500 mg) tablet Commonly known as:  Adams Miraaron Take 600 mg by mouth daily. Calcium-Vitamin D3-Vitamin K 615-587-20 mg-unit-mcg Chew Take  by mouth. CO Q-10 100 mg capsule Generic drug:  co-enzyme Q-10 Take 100 mg by mouth daily. * Doxepin 3 mg Tab Commonly known as:  Adonis Schwab Take 3 mg by mouth daily. * Doxepin 6 mg Tab Commonly known as:  Adonis Schwab Take 6 mg by mouth daily. * doxepin 10 mg capsule Commonly known as:  SINEquan Take 1 Cap by mouth nightly. fish oil-dha-epa 1,200-144-216 mg Cap Take 2 Tabs by mouth daily. Glucosamine &Chondroit-MV-Min3 091-377-35-0.5 mg Tab Take  by mouth.  
  
 magnesium oxide 500 mg Tab Take 500 mg by mouth daily. metFORMIN  mg tablet Commonly known as:  GLUCOPHAGE XR Take 1 Tab by mouth daily (with dinner). multivitamin tablet Commonly known as:  ONE A DAY Take 1 Tab by mouth daily. pregabalin 75 mg capsule Commonly known as:  Alphonso Mall Take 1 Cap by mouth two (2) times a day. Max Daily Amount: 150 mg.  
  
 raNITIdine 300 mg tablet Commonly known as:  ZANTAC Take 300 mg by mouth two (2) times a day. valACYclovir 1 gram tablet Commonly known as:  VALTREX  
500 mg. VITAMIN D3 2,000 unit Tab Generic drug:  cholecalciferol (vitamin D3) Take  by mouth daily. * Notice:   This list has 3 medication(s) that are the same as other medications prescribed for you. Read the directions carefully, and ask your doctor or other care provider to review them with you. We Performed the Following ALDOLASE [id number] CPT(R)] CK [19743 CPT(R)]   
 LD [id number] CPT(R)] MYASTHENIA GRAVIS PANEL (PROFILE II) [[id number] Custom] REFERRAL TO NEUROLOGY [EUF73 Custom] Comments:  
 EMG/NCS of BLE for eval for myopathy THYROID ANTIBODY PANEL [09423 CPT(R)] Referral Information Referral ID Referred By Referred To  
  
 [id number] Joseph HERRERA MD   
   [place] [address] [address] Phone: [phone] Fax: [phone] Visits Status Start Date End Date 1 New Request 9/15/17 9/15/18 If your referral has a status of pending review or denied, additional information will be sent to support the outcome of this decision. Patient Instructions A Healthy Lifestyle: Care Instructions Your Care Instructions A healthy lifestyle can help you feel good, stay at a healthy weight, and have plenty of energy for both work and play. A healthy lifestyle is something you can share with your whole family. A healthy lifestyle also can lower your risk for serious health problems, such as high blood pressure, heart disease, and diabetes. You can follow a few steps listed below to improve your health and the health of your family. Follow-up care is a key part of your treatment and safety. Be sure to make and go to all appointments, and call your doctor if you are having problems. Its also a good idea to know your test results and keep a list of the medicines you take. How can you care for yourself at home? · Do not eat too much sugar, fat, or fast foods. You can still have dessert and treats now and then. The goal is moderation. · Start small to improve your eating habits.  Pay attention to portion sizes, drink less juice and soda pop, and eat more fruits and vegetables. ¨ Eat a healthy amount of food. A 3-ounce serving of meat, for example, is about the size of a deck of cards. Fill the rest of your plate with vegetables and whole grains. ¨ Limit the amount of soda and sports drinks you have every day. Drink more water when you are thirsty. ¨ Eat at least 5 servings of fruits and vegetables every day. It may seem like a lot, but it is not hard to reach this goal. A serving or helping is 1 piece of fruit, 1 cup of vegetables, or 2 cups of leafy, raw vegetables. Have an apple or some carrot sticks as an afternoon snack instead of a candy bar. Try to have fruits and/or vegetables at every meal. 
· Make exercise part of your daily routine. You may want to start with simple activities, such as walking, bicycling, or slow swimming. Try to be active 30 to 60 minutes every day. You do not need to do all 30 to 60 minutes all at once. For example, you can exercise 3 times a day for 10 or 20 minutes. Moderate exercise is safe for most people, but it is always a good idea to talk to your doctor before starting an exercise program. 
· Keep moving. Mika Handsome the lawn, work in the garden, or [employer]. Take the stairs instead of the elevator at work. · If you smoke, quit. People who smoke have an increased risk for heart attack, stroke, cancer, and other lung illnesses. Quitting is hard, but there are ways to boost your chance of quitting tobacco for good. ¨ Use nicotine gum, patches, or lozenges. ¨ Ask your doctor about stop-smoking programs and medicines. ¨ Keep trying. In addition to reducing your risk of diseases in the future, you will notice some benefits soon after you stop using tobacco. If you have shortness of breath or asthma symptoms, they will likely get better within a few weeks after you quit. · Limit how much alcohol you drink.  Moderate amounts of alcohol (up to 2 drinks a day for men, 1 drink a day for women) are okay. But drinking too much can lead to liver problems, high blood pressure, and other health problems. Family health If you have a family, there are many things you can do together to improve your health. · Eat meals together as a family as often as possible. · Eat healthy foods. This includes fruits, vegetables, lean meats and dairy, and whole grains. · Include your family in your fitness plan. Most people think of activities such as jogging or tennis as the way to fitness, but there are many ways you and your family can be more active. Anything that makes you breathe hard and gets your heart pumping is exercise. Here are some tips: 
¨ Walk to do errands or to take your child to school or the bus. ¨ Go for a family bike ride after dinner instead of watching TV. Where can you learn more? Go to http://derejeCryptic Softwareyamilka.info/. Enter H385 in the search box to learn more about \"A Healthy Lifestyle: Care Instructions. \" Current as of: July 26, 2016 Content Version: 11.3 © 9975-8326 [employer]. Care instructions adapted under license by [employer] (which disclaims liability or warranty for this information). If you have questions about a medical condition or this instruction, always ask your healthcare professional. [place] [address] any warranty or liability for your use of this information. PRESCRIPTION REFILL POLICY [facility] Statement to Patients April 1, 2014 In an effort to ensure the large volume of patient prescription refills is processed in the most efficient and expeditious manner, we are asking our patients to assist us by calling your Pharmacy for all prescription refills, this will include also your  Mail Order Pharmacy. The pharmacy will contact our office electronically to continue the refill process. Please do not wait until the last minute to call your pharmacy. We need at least 48 hours (2days) to fill prescriptions. We also encourage you to call your pharmacy before going to  your prescription to make sure it is ready. With regard to controlled substance prescription refill requests (narcotic refills) that need to be picked up at our office, we ask your cooperation by providing us with at least 72 hours (3days) notice that you will need a refill. We will not refill narcotic prescription refill requests after 4:00pm on any weekday, Monday through Thursday, or after 2:00pm on Fridays, or on the weekends. We encourage everyone to explore another way of getting your prescription refill request processed using Exepron, our patient web portal through our electronic medical record system. Exepron is an efficient and effective way to communicate your medication request directly to the office and  downloadable as an veda on your smart phone . Exepron also features a review functionality that allows you to view your medication list as well as leave messages for your physician. Are you ready to get connected? If so please review the attatched instructions or speak to any of our staff to get you set up right away! Thank you so much for your cooperation. Should you have any questions please contact our Practice Administrator. The Physicians and Staff,  [facility] Introducing [facility]! Dear Lillian Montiel: Thank you for requesting a Exepron account. Our records indicate that you already have an active Exepron account. You can access your account anytime at [email or web address] Did you know that you can access your hospital and ER discharge instructions at any time in Exepron? You can also review all of your test results from your hospital stay or ER visit. Additional Information If you have questions, please visit the Frequently Asked Questions section of the A la Mobilehart website at [email or web address] Remember, [facility] is NOT to be used for urgent needs. For medical emergencies, dial 911. Now available from your iPhone and Android! Please provide this summary of care documentation to your next provider. Your primary care clinician is listed as Claudy Arevalo. If you have any questions after today's visit, please call [phone].

## 2017-09-15 NOTE — PATIENT INSTRUCTIONS
A Healthy Lifestyle: Care Instructions  Your Care Instructions  A healthy lifestyle can help you feel good, stay at a healthy weight, and have plenty of energy for both work and play. A healthy lifestyle is something you can share with your whole family. A healthy lifestyle also can lower your risk for serious health problems, such as high blood pressure, heart disease, and diabetes. You can follow a few steps listed below to improve your health and the health of your family. Follow-up care is a key part of your treatment and safety. Be sure to make and go to all appointments, and call your doctor if you are having problems. Its also a good idea to know your test results and keep a list of the medicines you take. How can you care for yourself at home? · Do not eat too much sugar, fat, or fast foods. You can still have dessert and treats now and then. The goal is moderation. · Start small to improve your eating habits. Pay attention to portion sizes, drink less juice and soda pop, and eat more fruits and vegetables. ¨ Eat a healthy amount of food. A 3-ounce serving of meat, for example, is about the size of a deck of cards. Fill the rest of your plate with vegetables and whole grains. ¨ Limit the amount of soda and sports drinks you have every day. Drink more water when you are thirsty. ¨ Eat at least 5 servings of fruits and vegetables every day. It may seem like a lot, but it is not hard to reach this goal. A serving or helping is 1 piece of fruit, 1 cup of vegetables, or 2 cups of leafy, raw vegetables. Have an apple or some carrot sticks as an afternoon snack instead of a candy bar. Try to have fruits and/or vegetables at every meal.  · Make exercise part of your daily routine. You may want to start with simple activities, such as walking, bicycling, or slow swimming. Try to be active 30 to 60 minutes every day. You do not need to do all 30 to 60 minutes all at once.  For example, you can exercise 3 times a day for 10 or 20 minutes. Moderate exercise is safe for most people, but it is always a good idea to talk to your doctor before starting an exercise program.  · Keep moving. Arik Freshwater the lawn, work in the garden, or [employer]. Take the stairs instead of the elevator at work. · If you smoke, quit. People who smoke have an increased risk for heart attack, stroke, cancer, and other lung illnesses. Quitting is hard, but there are ways to boost your chance of quitting tobacco for good. ¨ Use nicotine gum, patches, or lozenges. ¨ Ask your doctor about stop-smoking programs and medicines. ¨ Keep trying. In addition to reducing your risk of diseases in the future, you will notice some benefits soon after you stop using tobacco. If you have shortness of breath or asthma symptoms, they will likely get better within a few weeks after you quit. · Limit how much alcohol you drink. Moderate amounts of alcohol (up to 2 drinks a day for men, 1 drink a day for women) are okay. But drinking too much can lead to liver problems, high blood pressure, and other health problems. Family health  If you have a family, there are many things you can do together to improve your health. · Eat meals together as a family as often as possible. · Eat healthy foods. This includes fruits, vegetables, lean meats and dairy, and whole grains. · Include your family in your fitness plan. Most people think of activities such as jogging or tennis as the way to fitness, but there are many ways you and your family can be more active. Anything that makes you breathe hard and gets your heart pumping is exercise. Here are some tips:  ¨ Walk to do errands or to take your child to school or the bus. ¨ Go for a family bike ride after dinner instead of watching TV. Where can you learn more? Go to http://dereje-yamilka.info/. Enter F817 in the search box to learn more about \"A Healthy Lifestyle: Care Instructions. \"  Current as of: July 26, 2016  Content Version: 11.3  © 0293-6468 [employer], [employer]. Care instructions adapted under license by NationalField (which disclaims liability or warranty for this information). If you have questions about a medical condition or this instruction, always ask your healthcare professional. [address] any warranty or liability for your use of this information. 10 Formerly named [facility]   Statement to Patients  April 1, 2014      In an effort to ensure the large volume of patient prescription refills is processed in the most efficient and expeditious manner, we are asking our patients to assist us by calling your Pharmacy for all prescription refills, this will include also your  Mail Order Pharmacy. The pharmacy will contact our office electronically to continue the refill process. Please do not wait until the last minute to call your pharmacy. We need at least 48 hours (2days) to fill prescriptions. We also encourage you to call your pharmacy before going to  your prescription to make sure it is ready. With regard to controlled substance prescription refill requests (narcotic refills) that need to be picked up at our office, we ask your cooperation by providing us with at least 72 hours (3days) notice that you will need a refill. We will not refill narcotic prescription refill requests after 4:00pm on any weekday, Monday through Thursday, or after 2:00pm on Fridays, or on the weekends. We encourage everyone to explore another way of getting your prescription refill request processed using Medius, our patient web portal through our electronic medical record system. Medius is an efficient and effective way to communicate your medication request directly to the office and  downloadable as an veda on your smart phone .  Medius also features a review functionality that allows you to view your medication list as well as leave messages for your physician. Are you ready to get connected? If so please review the attatched instructions or speak to any of our staff to get you set up right away! Thank you so much for your cooperation. Should you have any questions please contact our Practice Administrator.     The Physicians and Staff,  [facility]

## 2017-09-19 LAB
ACHR BIND AB SER-SCNC: 0.03 NMOL/L (ref 0–0.24)
ALDOLASE SERPL-CCNC: 6.3 U/L (ref 3.3–10.3)
CK SERPL-CCNC: 427 U/L (ref 24–173)
LDH SERPL-CCNC: 258 IU/L (ref 119–226)
STRIA MUS AB TITR SER IF: NEGATIVE {TITER}
THYROGLOB AB SERPL-ACNC: <1 IU/ML (ref 0–0.9)
THYROPEROXIDASE AB SERPL-ACNC: 26 IU/ML (ref 0–34)

## 2017-09-28 ENCOUNTER — OFFICE VISIT (OUTPATIENT)
Dept: ENDOCRINOLOGY | Age: 57
End: 2017-09-28

## 2017-09-28 VITALS
SYSTOLIC BLOOD PRESSURE: 108 MMHG | DIASTOLIC BLOOD PRESSURE: 63 MMHG | WEIGHT: 117.2 LBS | HEIGHT: 67 IN | HEART RATE: 69 BPM | BODY MASS INDEX: 18.39 KG/M2

## 2017-09-28 DIAGNOSIS — R74.8 ABNORMAL CK: ICD-10-CM

## 2017-09-28 DIAGNOSIS — E78.5 HYPERLIPIDEMIA LDL GOAL <100: ICD-10-CM

## 2017-09-28 DIAGNOSIS — R73.02 IMPAIRED GLUCOSE TOLERANCE: Primary | ICD-10-CM

## 2017-09-28 RX ORDER — PREGABALIN 75 MG/1
75 CAPSULE ORAL
Qty: 21 CAP | Refills: 0
Start: 2017-09-28 | End: 2017-11-29 | Stop reason: DRUGHIGH

## 2017-09-28 NOTE — PROGRESS NOTES
Chief Complaint   Patient presents with    Diabetes     pcp and pharmacy confirmed     History of Present Illness: Gallito Silva is a 59 y.o. female here for follow up of diabetes. Weight down 2 lbs since last visit in 3/17. Since then, saw Dr. Lesli Urbina and was given lyrica and this helped with sleep but not much with her symptoms in her feet so she was just taking at night at 100 mg. Then saw Dr. Lorena Green and she has recommend EMG/NCS to further evaluate her symptoms and is helping her taper down off the lyrica as she has felt more mental dulling on the lyrica along with some mood changes so she is taking 75 mg at night right now. She has some samples of 50 mg from Dr. Delaney Mina and may try this. She cut back on the intensive monitoring of her blood sugars once her A1c was 5.2% and just focused on some post-meal readings and saw her biggest spikes were after lunch with readings around 160-170s about 30-45 minutes after eating. Did see one reading of 200 after tomato soup. She asked me about a trial of acarbose earlier this month and tried 1/2 tab and didn't see any improvement in her spike after lunch so she tried a whole pill and this didn't help as much. Therefore, she stopped this and instead decided to spread out the carb load over a longer period of eating lunch and this has helped keep her spikes closer to 130 or less. Trying to keep carb intake about 31% during the day and 19% protein and 45% fat and 5% fiber. Didn't end up filling the metformin until the beginning of August so her labs really on reflect about 6 weeks of metformin in her system. No GI side effects from this. Remains on atorvastatin 5 mg at bedtime. Current Outpatient Prescriptions   Medication Sig    pregabalin (LYRICA) 75 mg capsule Take 1 Cap by mouth two (2) times a day. Max Daily Amount: 150 mg.    valACYclovir (VALTREX) 1 gram tablet 500 mg.  Calcium-Vitamin D3-Vitamin K 815-526-06 mg-unit-mcg chew Take  by mouth.  metFORMIN ER (GLUCOPHAGE XR) 500 mg tablet Take 1 Tab by mouth daily (with dinner).  co-enzyme Q-10 (CO Q-10) 100 mg capsule Take 100 mg by mouth daily.  Glucosamine &Chondroit-MV-Min3 530-436-66-0.5 mg tab Take  by mouth.  raNITIdine (ZANTAC) 300 mg tablet Take 300 mg by mouth two (2) times a day.  multivitamin (ONE A DAY) tablet Take 1 Tab by mouth daily.  aspirin (ASPIRIN) 325 mg tablet Take 325 mg by mouth daily.  magnesium oxide 500 mg tab Take 500 mg by mouth daily.  cholecalciferol, vitamin D3, (VITAMIN D3) 2,000 unit tab Take  by mouth daily.  calcium carbonate (CALTREX) 600 mg (1,500 mg) tablet Take 600 mg by mouth daily.  fish oil-dha-epa 1,200-144-216 mg cap Take 2 Tabs by mouth daily.  atorvastatin (LIPITOR) 10 mg tablet Take 5 mg by mouth daily. No current facility-administered medications for this visit. No Known Allergies     Review of Systems:  - Eyes: no blurry vision or double vision  - Cardiovascular: no chest pain  - Respiratory: no shortness of breath  - Musculoskeletal: no myalgias  - Neurological: (+) numbness/tingling in extremities    Physical Examination:  Blood pressure 108/63, pulse 69, height 5' 7\" (1.702 m), weight 117 lb 3.2 oz (53.2 kg).   - General: pleasant, no distress, good eye contact   - Neck: no carotid bruits  - Cardiovascular: regular, normal rate, nl s1 and s2, no m/r/g,   - Respiratory: clear bilaterally  - Integumentary: no edema,   - Psychiatric: normal mood and affect    Data Reviewed:   Component      Latest Ref Rng & Units 9/11/2017 9/11/2017 9/11/2017 9/11/2017           8:40 AM  8:40 AM  8:40 AM  8:40 AM   WBC      3.4 - 10.8 x10E3/uL  4.9     RBC      3.77 - 5.28 x10E6/uL  4.59     HGB      11.1 - 15.9 g/dL  13.9     HCT      34.0 - 46.6 %  41.0     MCV      79 - 97 fL  89     MCH      26.6 - 33.0 pg  30.3     MCHC      31.5 - 35.7 g/dL  33.9     RDW      12.3 - 15.4 %  14.0     PLATELET      714 - 837 x10E3/uL  274 LDL-P      <1000 nmol/L 813      LDL-C      0 - 99 mg/dL 79      HDL-C      >39 mg/dL 82      Triglycerides      0 - 149 mg/dL 62      Cholesterol, total      100 - 199 mg/dL 173      HDL-P (Total)      >=30.5 umol/L 42.9      Small LDL-P      <=527 nmol/L <90      LDL size      >20.5 nm 21.7      LP-IR SCORE      <=45 <25      TSH      0.450 - 4.500 uIU/mL    2.510   VITAMIN D, 25-HYDROXY      30.0 - 100.0 ng/mL   57.6      Component      Latest Ref Rng & Units 9/11/2017 9/11/2017 9/11/2017           8:40 AM  8:40 AM  8:40 AM   Glucose      65 - 99 mg/dL 76     BUN      6 - 24 mg/dL 25 (H)     Creatinine      0.57 - 1.00 mg/dL 0.80     GFR est non-AA      >59 mL/min/1.73 81     GFR est AA      >59 mL/min/1.73 93     BUN/Creatinine ratio      9 - 23 31 (H)     Sodium      134 - 144 mmol/L 145 (H)     Potassium      3.5 - 5.2 mmol/L 4.7     Chloride      96 - 106 mmol/L 104     CO2      18 - 29 mmol/L 28     Calcium      8.7 - 10.2 mg/dL 9.8     Hemoglobin A1c, (calculated)      4.8 - 5.6 %   5.4   Estimated average glucose      mg/dL   108   Creatine Kinase,Total      24 - 173 U/L  387 (H)        Assessment/Plan:     1. Impaired glucose tolerance: Has had mildly elevated A1c levels since early 2015 when it was 5.9% and then 5.5% in 8/15, 5.9% in 4/16 and 5.5% in 8/16. Had a 3 hour glucose tolerance tolerance test in 11/15 that showed a fasting sugar of 80, 1 hour of 121, 2 hour of 55 and 3 hour of 49. Her A1c was 5.6% at 23 [facility] when I met her in 11/16. It was unclear if she could be having intermittent spikes in her glucose that are contributing to her peripheral neuropathy so she started testing her sugars after eating and did identify spikes so has cut back to 30g of carbs or less per meal and 15-17g for snacks. Her A1c was down to 5.2% in 3/17 by POC and despite this, is still having numbness in her feet.   Her A1c was 5.4% in 9/17 despite starting metformin 500 in 8/17 and continues to have symptoms in her feet.  I'm truly unsure if her fluctuating sugars are contributing to the symptoms she is having and agree with further evaluation by Dr. Sean Wills. - consider Metformin  mg with dinner  - check A1c and bmp prior to next visit  - check A1c in 3 months per her request      2. Hyperlipidemia LDL goal <100: LDL 68 and  in 11/16 while taking atorva 5 mg and she cut back to 2.5 mg daily to see if this would help her CK and it didn't and her LDL apparently went higher when checked by her cardiologist so she has gone back to 5 mg daily. LDL 79 and  in 9/17  - cont atorva 5 mg daily  - check NMR lipid profile prior to next visit      3. Abnormal CK: this has been stable in the 200-400 range so will only check annually at this point.  - check CK in 9/18    We spent 40 minutes of face to face time together and > 50% of the time was spent in counseling regarding management of the conditions above. Patient Instructions   1) Feel free to taper down on the lyrica to 50 mg daily for 1 week and then 50 mg every other day and then stop. 2) Your A1c is still normal at 5.4% and truthfully this is about the same as the 5.2% from your fingerstick. 3) Your cholesterol is still very stable and your HDL (good cholesterol) is actually 11 points higher than last time. 4) I think it's fine to continue the metformin for now and monitor your post-meal readings as you have been doing. 5) Have your A1c repeated in 3 months. Follow-up Disposition:  Return in about 6 months (around 3/28/2018).     Copy sent to:  Dr. Faith Flores via connect care   Dr. Wade Peoples in [address] in [facility] MD Dr. Lisa Saunders via connect care  Dr. Antonia Tate follow up: 12/27/17  Component      Latest Ref Rng & Units 12/26/2017           4:36 PM   Hemoglobin A1c, (calculated)      4.8 - 5.6 % 5.4   Estimated average glucose      mg/dL 108     Sent her the following message through [employer]:  Your Hemoglobin A1c (3 month test of blood sugar) remains normal at 5.4% and is the exact same as last check. Keep your dose of metformin the same until you come back to see me.

## 2017-09-28 NOTE — PATIENT INSTRUCTIONS
1) Feel free to taper down on the lyrica to 50 mg daily for 1 week and then 50 mg every other day and then stop. 2) Your A1c is still normal at 5.4% and truthfully this is about the same as the 5.2% from your fingerstick. 3) Your cholesterol is still very stable and your HDL (good cholesterol) is actually 11 points higher than last time. 4) I think it's fine to continue the metformin for now and monitor your post-meal readings as you have been doing. 5) Have your A1c repeated in 3 months.

## 2017-09-28 NOTE — MR AVS SNAPSHOT
Visit Information Date & Time Provider Department Dept. Phone Encounter #  
 9/28/2017  8:50 AM Susan Leigh MD [facility]  Follow-up Instructions Return in about 6 months (around 3/28/2018). Your Appointments 10/2/2017 11:00 AM  
PROCEDURE with EMG SCHEDULE [address] [email or web address] ([facility]) Appt Note: EMG/NCS BLE  
 [place] [address] [facility] [facility] [address] [phone]  
  
   
 Scout Vega  
  
    
 10/10/2017  8:40 AM  
Follow Up with Bassem Carrington MD  
[address] [facility] ([facility]) Appt Note: neuropathy/EMG results [place] [address] [facility] [address] [phone]  
  
   
 [place] [address] [id number] [address] Upcoming Health Maintenance Date Due DTaP/Tdap/Td series (1 - Tdap) 4/21/1979 PAP AKA CERVICAL CYTOLOGY 4/21/1979 FOBT Q 1 YEAR AGE 50-75 4/21/2008 INFLUENZA AGE 9 TO ADULT 8/1/2017 BREAST CANCER SCRN MAMMOGRAM 12/8/2018 Allergies as of 9/28/2017  Review Complete On: 9/28/2017 By: Susan Leigh MD  
 No Known Allergies Current Immunizations  Reviewed on 11/29/2016 Name Date Influenza Vaccine (Quad) PF 11/29/2016 Not reviewed this visit You Were Diagnosed With   
  
 Codes Comments Impaired glucose tolerance    -  Primary ICD-10-CM: R73.02 
ICD-9-CM: 790.22 Abnormal CK     ICD-10-CM: R74.8 ICD-9-CM: 790.5 Hyperlipidemia LDL goal <100     ICD-10-CM: E78.5 ICD-9-CM: 272.4 Vitals BP Pulse Height(growth percentile) Weight(growth percentile) BMI OB Status 108/63 69 5' 7\" (1.702 m) 117 lb 3.2 oz (53.2 kg) 18.36 kg/m2 Menopause Smoking Status Never Smoker Vitals History BMI and BSA Data Body Mass Index Body Surface Area  
 18.36 kg/m 2 1.59 m 2 Preferred Pharmacy Pharmacy Name Phone [facility] [address] [facility], [address] [phone] Your Updated Medication List  
  
   
This list is accurate as of: 9/28/17  9:49 AM.  Always use your most recent med list.  
  
  
  
  
 aspirin 325 mg tablet Commonly known as:  ASPIRIN Take 325 mg by mouth daily. atorvastatin 10 mg tablet Commonly known as:  LIPITOR Take 5 mg by mouth daily. calcium carbonate 600 mg calcium (1,500 mg) tablet Commonly known as:  Ruthellen Blind Take 600 mg by mouth daily. Calcium-Vitamin D3-Vitamin K 260-616-32 mg-unit-mcg Chew Take  by mouth. CO Q-10 100 mg capsule Generic drug:  co-enzyme Q-10 Take 100 mg by mouth daily. fish oil-dha-epa 1,200-144-216 mg Cap Take 2 Tabs by mouth daily. Glucosamine &Chondroit-MV-Min3 542-223-15-0.5 mg Tab Take  by mouth.  
  
 magnesium oxide 500 mg Tab Take 500 mg by mouth daily. metFORMIN  mg tablet Commonly known as:  GLUCOPHAGE XR Take 1 Tab by mouth daily (with dinner). multivitamin tablet Commonly known as:  ONE A DAY Take 1 Tab by mouth daily. pregabalin 75 mg capsule Commonly known as:  Sameul [facility] Take 1 Cap by mouth nightly. Max Daily Amount: 75 mg.  
  
 raNITIdine 300 mg tablet Commonly known as:  ZANTAC Take 300 mg by mouth two (2) times a day. valACYclovir 1 gram tablet Commonly known as:  VALTREX  
500 mg. VITAMIN D3 2,000 unit Tab Generic drug:  cholecalciferol (vitamin D3) Take  by mouth daily. We Performed the Following HEMOGLOBIN A1C WITH EAG [44422 CPT(R)] Follow-up Instructions Return in about 6 months (around 3/28/2018). To-Do List   
 03/18/2018 Lab:  HEMOGLOBIN A1C WITH EAG   
  
 03/18/2018 Lab:  METABOLIC PANEL, BASIC   
  
 03/18/2018   Lab:  [facility]   
  
  
 Patient Instructions 1) Feel free to taper down on the lyrica to 50 mg daily for 1 week and then 50 mg every other day and then stop. 2) Your A1c is still normal at 5.4% and truthfully this is about the same as the 5.2% from your fingerstick. 3) Your cholesterol is still very stable and your HDL (good cholesterol) is actually 11 points higher than last time. 4) I think it's fine to continue the metformin for now and monitor your post-meal readings as you have been doing. 5) Have your A1c repeated in 3 months. Introducing [facility]! Dear Roni Dear: Thank you for requesting a [employer] account. Our records indicate that you already have an active [employer] account. You can access your account anytime at [email or web address] Did you know that you can access your hospital and ER discharge instructions at any time in [employer]? You can also review all of your test results from your hospital stay or ER visit. Additional Information If you have questions, please visit the Frequently Asked Questions section of the [employer] website at [email or web address] Remember, [employer] is NOT to be used for urgent needs. For medical emergencies, dial 911. Now available from your iPhone and Android! Please provide this summary of care documentation to your next provider. Your primary care clinician is listed as Rossana Kerr. If you have any questions after today's visit, please call [phone].

## 2017-09-29 ENCOUNTER — TELEPHONE (OUTPATIENT)
Dept: NEUROLOGY | Age: 57
End: 2017-09-29

## 2017-09-29 NOTE — TELEPHONE ENCOUNTER
Spoke with patient rescheduled emg  Patient has a busy schedule and wanted to know if results could be given over the phone or dr heller has limited follow ups appt  Please call patient

## 2017-09-29 NOTE — TELEPHONE ENCOUNTER
Contacted patient and advised her to send us a [employer] message a few days after her testing as a reminder for us to either call or message EMG results. 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

## 2017-10-12 ENCOUNTER — OFFICE VISIT (OUTPATIENT)
Dept: NEUROLOGY | Age: 57
End: 2017-10-12

## 2017-10-12 DIAGNOSIS — M79.672 PAIN IN BOTH FEET: Primary | ICD-10-CM

## 2017-10-12 DIAGNOSIS — M79.671 PAIN IN BOTH FEET: Primary | ICD-10-CM

## 2017-10-12 NOTE — PROCEDURES
EMG/ NCS Report  [facility], [address]  Ph: [phone]/ [phone]  FAX: [phone]/ [phone]  Test Date:  10/12/2017    Patient: Julian Hernandez : 1960 Physician: Edward Gill MD   Sex: Female Height: ' \" Ref Geeta Cooper   ID#: [id number] Weight:  lbs. Technician: Renetta Oswald     Patient History / Exam:  CC:R/O MYOPATHY,PRE DIABETIC, SENSORY NEUROPATHY. EMG & NCV Findings:  Evaluation of the left Fibular motor nerve showed normal distal onset latency (6.5 ms), reduced amplitude (1.2 mV), decreased conduction velocity (B Fib-Ankle, 35 m/s), and decreased conduction velocity (Poplt-B Fib, 36 m/s). The right Fibular motor nerve showed normal distal onset latency (3.8 ms), normal amplitude (3.0 mV), normal conduction velocity (B Fib-Ankle, 41 m/s), and normal conduction velocity (Poplt-B Fib, 43 m/s). The left Fibular TA motor nerve showed normal distal onset latency (3.4 ms), normal amplitude (7.1 mV), and normal conduction velocity (Poplit-Fib Head, 53 m/s). The left tibial motor and the right tibial motor nerves showed normal distal onset latency (L3.5, R3.6 ms), normal amplitude (L6.1, R5.9 mV), and normal conduction velocity (Knee-Ankle, L44, R48 m/s). The left Sup Fibular sensory nerve showed normal distal peak latency (2.8 ms), reduced amplitude (4.1 µV), and normal conduction velocity (Lower leg-Lat ankle, 48 m/s). The right Sup Fibular sensory nerve showed normal distal peak latency (2.7 ms), normal amplitude (6.8 µV), and normal conduction velocity (Lower leg-Lat ankle, 48 m/s). The left sural sensory and the right sural sensory nerves showed normal distal peak latency (L3.7, R3.4 ms) and normal amplitude (L5.1, R4.7 µV). All F Wave latencies were within normal limits. All F Wave left vs. right side latency differences were within normal limits. All H Reflex left vs. right side latency differences were within normal limits. Needle evaluation of the left extensor digitorum brevis, the left posterior tibialis, and the left anterior tibialis muscles showed diminished recruitment, Incr Duration, and increased motor unit amplitude. The left gluteus medius muscle showed diminished recruitment. All remaining muscles (as indicated in the following table) showed no evidence of electrical instability. Impression:  ABNORMAL    Extensive electrodiagnostic examination of the left and right lower extremities shows the followin) Intact bilateral sural sensory responses. Slightly reduced left peroneal sensory amplitude response compared to the right, which can be technical in nature. 2) Reduced left peroneal motor amplitude response compared to the right  2) Chronic, without active, motor axon loss changes in muscles innervated by left L5 root/segment    These findings are suggestive of an underlying chronic, left L5 motor radiculopathy. Recommend radiological correlation. No myopathic looking motor units were noted. Electrodiagnostic examination of the right lower extremity is within normal limits.          Janell Matthew MD  Diplomate, American Board of Psychiatry and Neurology  Diplomate, Neuromuscular Medicine  Diplomate, American Board of Electrodiagnostic Medicine          Nerve Conduction Studies  Anti Sensory Summary Table     Stim Site NR Peak (ms) Norm Peak (ms) P-T Amp (µV) Norm P-T Amp Site1 Site2 Dist (cm)   Left Sup Fibular Anti Sensory (Lat ankle)  22.5°C   Lower leg    2.8 <4.5 4.1 >5 Lower leg Lat ankle 10.0   Site 2    2.8  3.8       Site 3    2.7  2.8           2.9  7.0       Right Sup Fibular Anti Sensory (Lat ankle)  32.3°C   Lower leg    2.7 <4.5 6.8 >5 Lower leg Lat ankle 10.0   Site 2    2.8  7.0       Site 3    2.7  6.4       Left Sural Anti Sensory (Lat Mall)  31°C   Calf    3.7 <4.5 5.1 >4.0 Calf Lat Mall 14.0   Site 2    3.9  3.6       Right Sural Anti Sensory (Lat Mall)  33°C   Calf    3.4 <4.5 4. 7 >4.0 Calf Lat Mall 14.0   Site 2    3.7  5.4         Motor Summary Table     Stim Site NR Onset (ms) Norm Onset (ms) O-P Amp (mV) Norm O-P Amp Amp (Prev) (%) Site1 Site2 Dist (cm) Rj (m/s) Norm Rj (m/s)   Left Fibular Motor (Ext Dig Brev)  23°C   Ankle    6.5 <6.5 1.2 >2.6 100.0 Ankle Ext Dig Brev 8.0     B Fib    15.1  1.1  91.7 B Fib Ankle 30.0 35 >38   Poplt    17.9  1.0  90.9 Poplt B Fib 10.0 36 >42   Right Fibular Motor (Ext Dig Brev)  31.4°C   Ankle    3.8 <6.5 3.0 >2.6 100.0 Ankle Ext Dig Brev 8.0     B Fib    10.6  3.0  100.0 B Fib Ankle 28.0 41 >38   Poplt    12.9  3.0  100.0 Poplt B Fib 10.0 43 >42   Left Fibular TA Motor (Tib Ant)  29.2°C   Fib Head    3.4 <4.0 7.1 >4.0 100.0 Fib Head Tib Ant 10.0     Poplit    5.3  6.8  95.8 Poplit Fib Head 10.0 53 >40   Left Tibial Motor (Abd Montague Brev)  29°C   Ankle    3.5 <6.1 6.1 >5.3 100.0 Ankle Abd Montague Brev 8.0     Knee    11.9  4.0  65.6 Knee Ankle 37.0 44 >39   Right Tibial Motor (Abd Montague Brev)  30.8°C   Ankle    3.6 <6.1 5.9 >5.3 100.0 Ankle Abd Montague Brev 8.0     Knee    12.0  3.5  59.3 Knee Ankle 40.0 48 >39     F Wave Studies     NR F-Lat (ms) Lat Norm (ms) L-R F-Lat (ms) L-R Lat Norm   Left Tibial (Mrkrs) (Abd Hallucis)  28.7°C      46.08 <56 3.96 <5.7   Right Tibial (Mrkrs) (Abd Hallucis)  30.7°C      50.04 <56 3.96 <5.7     H Reflex Studies     NR H-Lat (ms) L-R H-Lat (ms) L-R Lat Norm   Left Tibial (Gastroc)  28.5°C      38.93 1.47 <2.0   Right Tibial (Gastroc)  30.5°C      37.47 1.47 <2.0     EMG     Side Muscle Nerve Root Ins Act Fibs Psw Recrt Duration Amp Poly Comment   Left Ext Dig Brev Dp Br Peron L5, S1 Nml Nml Nml Reduced Incr Incr Nml    Left PostTibialis Tibial L5, S1 Nml Nml Nml Reduced Incr Incr Nml    Left AbdHallucis MedPlantar S1-2 Nml Nml Nml Nml Nml Nml Nml    Left AntTibialis Dp Br Peron L4-5 Nml Nml Nml Reduced Incr Incr Nml    Left MedGastroc Tibial S1-2 Nml Nml Nml Nml Nml Nml Nml    Left VastusLat Femoral L2-4 Nml Nml Nml Nml Nml Nml Nml    Left GluteusMed SupGluteal L4-S1 Nml Nml Nml Reduced Nml Nml Nml    Left Lower Lumb Parasp Rami L5,S1 Nml Nml Nml Nml Nml Nml Nml    Right Ext Dig Brev Dp Br Peron L5, S1 Nml Nml Nml Nml Nml Nml Nml    Right AbdHallucis MedPlantar S1-2 Nml Nml Nml Nml Nml Nml Nml    Right AntTibialis Dp Br Peron L4-5 Nml Nml Nml Nml Nml Nml Nml    Right MedGastroc Tibial S1-2 Nml Nml Nml Nml Nml Nml Nml    Right VastusLat Femoral L2-4 Nml Nml Nml Nml Nml Nml Nml                Nerve Conduction Studies  Anti Sensory Left/Right Comparison     Stim Site L Lat (ms) R Lat (ms) L-R Lat (ms) L Amp (µV) R Amp (µV) L-R Amp (%) Site1 Site2 L Rj (m/s) R Rj (m/s) L-R Rj (m/s)   Sup Fibular Anti Sensory (Lat ankle)  22.5°C   Lower leg 2.1 2.1 0.0 4.1 6.8 39.7 Lower leg Lat ankle 48 48 0   Site 2 2.0   3.8          Site 3 2.1   2.8           2.0   7.0          Sural Anti Sensory (Lat Mall)  31°C   Calf 3.3 2.9 0.4 5.1 4.7 7.8 Calf Lat Mall 42 48 6   Site 2 3.4 2.7 0.7 3.6 5.4 33.3          Motor Left/Right Comparison     Stim Site L Lat (ms) R Lat (ms) L-R Lat (ms) L Amp (mV) R Amp (mV) L-R Amp (%) Site1 Site2 L Rj (m/s) R Rj (m/s) L-R Rj (m/s)   Fibular Motor (Ext Dig Brev)  23°C   Ankle 6.5 3.8 2.7 1.2 3.0 60.0 Ankle Ext Dig Brev      B Fib 15.1 10.6 4.5 1.1 3.0 63.3 B Fib Ankle 35 41 6   Poplt 17.9 12.9 5.0 1.0 3.0 66.7 Poplt B Fib 36 43 7   Fibular TA Motor (Tib Ant)  29.2°C   Fib Head 3.4   7.1   Fib Head Tib Ant      Poplit 5.3   6.8   Poplit Fib Head 53     Tibial Motor (Abd Montague Brev)  29°C   Ankle 3.5 3.6 0.1 6.1 5.9 3.3 Ankle Abd Montague Brev      Knee 11.9 12.0 0.1 4.0 3.5 12.5 Knee Ankle 44 48 4         Waveforms:

## 2017-10-31 ENCOUNTER — TELEPHONE (OUTPATIENT)
Dept: NEUROLOGY | Age: 57
End: 2017-10-31

## 2017-10-31 NOTE — TELEPHONE ENCOUNTER
Returned call to patient. Informed her that Dr. Sapna Bird can see her Nov 29th @ 11:20AM. Patient voices understanding and agrees to the appointment.

## 2017-10-31 NOTE — TELEPHONE ENCOUNTER
Regarding: RE: Test Results Question  Contact: [phone]  ----- Message from Parminder Sparks MD sent at 10/30/2017 10:54 AM EDT -----  Can you arrange for a follow-up and fax this Lyrica prescription and     ----- Message sent from Parminder Sparks MD to Magnus Mendiola at 10/30/2017 10:53 AM -----   Hi,     So sorry your appointments got rescheduled. The EMG reports don't usually post to [facility] but I have included the results below. It did not show any indication of a myopathy or neuropathy as we were looking for. It does show that you have chronic changes due to the disc at L4-5. I think at our last visit we discussed that her symptoms did not seem to be explained by this disc, so we were not going to pursue surgical intervention at this point. Assuming you have not developed any new left leg weakness or severe radiating pain, I think that plan is still fine. I do not think we need to pursue muscle biopsy at this time either, since there is no indication of muscle abnormality on the EMG. I am happy to send in a prescription for the lower dose of the Lyrica for you and will take care of this today. Impression:  ABNORMAL     Extensive electrodiagnostic examination of the left and right lower extremities shows the followin) Intact bilateral sural sensory responses. Slightly reduced left peroneal sensory amplitude response compared to the right, which can be technical in nature. 2) Reduced left peroneal motor amplitude response compared to the right  2) Chronic, without active, motor axon loss changes in muscles innervated by left L5 root/segment     These findings are suggestive of an underlying chronic, left L5 motor radiculopathy. Recommend radiological correlation.     No myopathic looking motor units were noted.     Electrodiagnostic examination of the right lower extremity is within normal limits. If you have any additional questions, please let me know.   I will have Jena Hwang set up a follow-up that works for you, and you can keep me posted on any changes in your condition or if we need to adjust her medication until then. Thanks  Dr. Karthikeyan Esposito          ----- Message -----     From: Priyanka KaurKaren Brock Rued: 10/26/2017  3:39 PM EDT       To: Maral Horvath MD  Subject: Test Results Question    Hi Dr. Karthikeyan Esposito- can you please post the results of my EMG from 10/12/2017 on the portal?  Dr. Tavo Giles postponed the original date of the EMG, so I had to cancel my follow-up appt with you b/c it was before the EMG was done. I was hoping you and I could discuss the results by phone after I have a chance to look at them? Otherwise, it would be awhile before I can get another appt. I tried the Lyrica 75 mg samples you gave me, and have decided that the 50 mg size would be sufficient to try. Could you please transmit a prescription to [facility] in [place] for a 30 day supply, and I will use the [employer] Savings Card from [employer].  Thank you!   -Qamar Malik

## 2017-11-29 ENCOUNTER — OFFICE VISIT (OUTPATIENT)
Dept: NEUROLOGY | Age: 57
End: 2017-11-29

## 2017-11-29 VITALS
BODY MASS INDEX: 18.58 KG/M2 | SYSTOLIC BLOOD PRESSURE: 106 MMHG | HEART RATE: 75 BPM | HEIGHT: 67 IN | DIASTOLIC BLOOD PRESSURE: 62 MMHG | RESPIRATION RATE: 18 BRPM | OXYGEN SATURATION: 98 % | WEIGHT: 118.4 LBS | TEMPERATURE: 98.7 F

## 2017-11-29 DIAGNOSIS — G62.9 SMALL FIBER NEUROPATHY: Primary | ICD-10-CM

## 2017-11-29 DIAGNOSIS — M79.671 PAIN IN BOTH FEET: ICD-10-CM

## 2017-11-29 DIAGNOSIS — M79.672 PAIN IN BOTH FEET: ICD-10-CM

## 2017-11-29 DIAGNOSIS — R74.8 ABNORMAL CK: ICD-10-CM

## 2017-11-29 NOTE — MR AVS SNAPSHOT
Visit Information Date & Time Provider Department Dept. Phone Encounter #  
 11/29/2017 11:20 AM Cassidy Jaffe MD [facility] Neurology [facility] [phone] [id number] Your Appointments 3/30/2018  1:30 PM  
Follow Up with Dasia Simpson MD  
[facility] [address]) Appt Note: 6 month f/u Diabetes [address] [id number] [facility] Upcoming Health Maintenance Date Due DTaP/Tdap/Td series (1 - Tdap) 4/21/1979 PAP AKA CERVICAL CYTOLOGY 4/21/1979 FOBT Q 1 YEAR AGE 50-75 4/21/2008 BREAST CANCER SCRN MAMMOGRAM 12/8/2018 Allergies as of 11/29/2017  Review Complete On: 9/28/2017 By: Dasia Simpson MD  
 No Known Allergies Current Immunizations  Reviewed on 11/29/2016 Name Date Influenza Vaccine 11/19/2017 Influenza Vaccine (Quad) PF 11/29/2016 Not reviewed this visit Vitals BP Pulse Temp Resp Height(growth percentile) Weight(growth percentile) 106/62 75 98.7 °F (37.1 °C) (Oral) 18 5' 7\" (1.702 m) 118 lb 6.4 oz (53.7 kg) SpO2 BMI OB Status Smoking Status 98% 18.54 kg/m2 Menopause Never Smoker Vitals History BMI and BSA Data Body Mass Index Body Surface Area 18.54 kg/m 2 1.59 m 2 Preferred Pharmacy Pharmacy Name Phone [facility] [address] [facility], [address] [place]  [phone] Your Updated Medication List  
  
   
This list is accurate as of: 11/29/17 12:09 PM.  Always use your most recent med list.  
  
  
  
  
 aspirin 325 mg tablet Commonly known as:  ASPIRIN Take 325 mg by mouth daily. atorvastatin 10 mg tablet Commonly known as:  LIPITOR Take 5 mg by mouth daily. calcium carbonate 600 mg calcium (1,500 mg) tablet Commonly known as:  Brian Js Take 600 mg by mouth daily. CO Q-10 100 mg capsule Generic drug:  co-enzyme Q-10 Take 100 mg by mouth daily. fish oil-dha-epa 1,200-144-216 mg Cap Take 2 Tabs by mouth daily. Glucosamine &Chondroit-MV-Min3 782-244-03-0.5 mg Tab Take  by mouth.  
  
 magnesium oxide 500 mg Tab Take 500 mg by mouth daily. metFORMIN  mg tablet Commonly known as:  GLUCOPHAGE XR Take 1 Tab by mouth daily (with dinner). multivitamin tablet Commonly known as:  ONE A DAY Take 1 Tab by mouth daily. pregabalin 50 mg capsule Commonly known as:  Nyoka Cotton Take 1 Cap by mouth nightly. Max Daily Amount: 50 mg.  
  
 raNITIdine 300 mg tablet Commonly known as:  ZANTAC Take 300 mg by mouth two (2) times a day. valACYclovir 1 gram tablet Commonly known as:  VALTREX  
500 mg. VITAMIN D3 2,000 unit Tab Generic drug:  cholecalciferol (vitamin D3) Take  by mouth daily. Patient Instructions PRESCRIPTION REFILL POLICY [facility] Statement to Patients April 1, 2014 In an effort to ensure the large volume of patient prescription refills is processed in the most efficient and expeditious manner, we are asking our patients to assist us by calling your Pharmacy for all prescription refills, this will include also your  Mail Order Pharmacy. The pharmacy will contact our office electronically to continue the refill process. Please do not wait until the last minute to call your pharmacy. We need at least 48 hours (2days) to fill prescriptions. We also encourage you to call your pharmacy before going to  your prescription to make sure it is ready. With regard to controlled substance prescription refill requests (narcotic refills) that need to be picked up at our office, we ask your cooperation by providing us with at least 72 hours (3days) notice that you will need a refill.  
 
We will not refill narcotic prescription refill requests after 4:00pm on any weekday, Monday through Thursday, or after 2:00pm on Fridays, or on the weekends. We encourage everyone to explore another way of getting your prescription refill request processed using [facility], our patient web portal through our electronic medical record system. [employer] is an efficient and effective way to communicate your medication request directly to the office and  downloadable as an veda on your smart phone . [facility] also features a review functionality that allows you to view your medication list as well as leave messages for your physician. Are you ready to get connected? If so please review the attatched instructions or speak to any of our staff to get you set up right away! Thank you so much for your cooperation. Should you have any questions please contact our Practice Administrator. The Physicians and Staff,  [facility] Patient Instructions/Plans: 
Lamotrigine (By mouth) Lamotrigine (la-YENNY-tri-jeen) Treats seizures and bipolar disorder. Brand Name(s): LaMICtal, LaMICtal CD, LaMICtal ODT, LaMICtal ODT Patient Titration Kit Blue, LaMICtal ODT Patient Titration Kit Clearance Dadds, LaMICtal ODT Patient Titration Kit Orange, LaMICtal Starter Kit [employer], [employer] Kit Green, [employer], LaMICtal XR, LaMICtal XR Patient Titration Kit Blue, LaMICtal XR Patient Titration Kit Green, LaMICtal XR Patient Titration Kit [employer] There may be other brand names for this medicine. When This Medicine Should Not Be Used: This medicine is not right for everyone. Do not use it if you had an allergic reaction to lamotrigine. How to Use This Medicine:  
Tablet, Chewable Tablet, Dissolving Tablet, Long Acting Tablet · Take your medicine as directed. Your dose may need to be changed several times to find what works best for you.  
· Chewable tablet: You may swallow the tablet whole, or you may chew it and then swallow a small amount of water or diluted fruit juice. You may also dissolve the chewable tablet. To do this, put about 1 teaspoon of water or juice in a glass, drop in the tablet, let it sit for about 1 minute so it dissolves, swirl the glass to mix, and then swallow the entire mixture. · Disintegrating tablet: Make sure your hands are dry before you handle the tablet. Place the tablet on your tongue. Move the tablet around in your mouth so it dissolves. · Regular tablet: Swallow the tablet whole. You may break or crush the tablet if your doctor tells you to, but the medicine might leave a bitter taste in your mouth. · Swallow the extended-release tablet whole. Do not crush, break, or chew it. · This medicine should come with a Medication Guide. Ask your pharmacist for a copy if you do not have one. · Missed dose: Take a dose as soon as you remember. If it is almost time for your next dose, wait until then and take a regular dose. Do not take extra medicine to make up for a missed dose. · Store the medicine in a closed container at room temperature, away from heat, moisture, and direct light. Do not use if the blister pack is torn or broken. Drugs and Foods to Avoid: Ask your doctor or pharmacist before using any other medicine, including over-the-counter medicines, vitamins, and herbal products. · Some foods and medicines can affect how lamotrigine works. Tell your doctor if you are using any of the following: ¨ Atazanavir, ritonavir, lopinavir, rifampin ¨ Birth control pills ¨ Other medicine to control seizures, including carbamazepine, divalproex, oxcarbazepine, phenobarbital, phenytoin, primidone, valproic acid, valproate Warnings While Using This Medicine: · Tell your doctor if you are pregnant or breastfeeding, or if you have kidney disease, liver disease, or a history of depression. Tell your doctor if you have had a rash or an allergic reaction to other seizure medicine. · This medicine may cause the following problems: ¨ Drug reaction with eosinophilia and systemic symptoms (DRESS), which may damage organs such as the liver, kidney, or heart ¨ Increased risk of thoughts of suicide or other serious mood changes ¨ Low blood cell counts, which may cause bleeding problems or increase your risk for infection ¨ Meningitis ¨ Severe skin rash that can lead to hospitalization or death · This medicine may make you dizzy or drowsy. Do not drive or do anything else that could be dangerous until you know how this medicine affects you. · Do not stop using this medicine suddenly. Your doctor will need to slowly decrease your dose before you stop it completely. · Your doctor will do lab tests at regular visits to check on the effects of this medicine. Keep all appointments. · Keep all medicine out of the reach of children. Never share your medicine with anyone. Possible Side Effects While Using This Medicine:  
Call your doctor right away if you notice any of these side effects: · Allergic reaction: Itching or hives, swelling in your face or hands, swelling or tingling in your mouth or throat, chest tightness, trouble breathing · Blistering, peeling, or red skin rash · Feeling depressed, irritable, or restless · Fever, chills, cough, sore throat, and body aches · Fever, skin rash, or swollen glands in your armpits, neck, or groin · Painful sores in your mouth or around your eyes · Stiff neck or back, headache, fever, nausea, vomiting · Thoughts of hurting yourself, other unusual thoughts or behaviors · Unusual bleeding, bruising, or weakness · Yellow skin or eyes If you notice these less serious side effects, talk with your doctor: · Blurred vision, double vision, or other vision problems · Clumsiness, dizziness, sleepiness, problems with balance or walking · Nausea, vomiting · Runny or stuffy nose If you notice other side effects that you think are caused by this medicine, tell your doctor. Call your doctor for medical advice about side effects. You may report side effects to FDA at 8-433-FDA-5635 © 2017 [address] Information is for End User's use only and may not be sold, redistributed or otherwise used for commercial purposes. The above information is an  only. It is not intended as medical advice for individual conditions or treatments. Talk to your doctor, nurse or pharmacist before following any medical regimen to see if it is safe and effective for you. Introducing [facility]! Dear Arnaud Rico: Thank you for requesting a [employer] account. Our records indicate that you already have an active [employer] account. You can access your account anytime at [email or web address] Did you know that you can access your hospital and ER discharge instructions at any time in [employer]? You can also review all of your test results from your hospital stay or ER visit. Additional Information If you have questions, please visit the Frequently Asked Questions section of the [employer] website at [email or web address] Remember, [employer] is NOT to be used for urgent needs. For medical emergencies, dial 911. Now available from your iPhone and Android! Please provide this summary of care documentation to your next provider. Your primary care clinician is listed as Zbigniew Troncoso. If you have any questions after today's visit, please call [phone].

## 2017-11-29 NOTE — LETTER
Reviewed record in preparation for visit and have necessary documentation Pt did not bring medication to office visit for review Information was given to pt on Advanced Directives, Living Will 
opportunity was given for questions Neurology Progress Note Patient ID: 
Nicki Mendes [id number] 
41 y.o. 
1960 HISTORY PROVIDED BY: 
Patient Chief Complaint: Neuropathy Subjective:  
 Ms. Kendrick Lorenz is here for follow up today of suspected small fiber neuropathy secondary to prediabetes. 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 Patient is to follow at [facility]. I did speak with her neurologist there and discussed the case. Since my last visit patient did have a repeat EMG/NCS. This showed some Evidence for chronic L5 radiculopathy. Patient does have a lumbar stenosis at L4/5 but this is moderate. Does not seem to fit with her current symptoms. Patient does report that her pain again is in her metatarsals. However when she has the significant vascular changes there is no pain. Therefore she feels like other with her myalgia may not be the right terms for her. She notes that temperature and change in pressure affect her vasculature. Her refill is very slow. Her left foot is much worse in her right foot. She has a heel numbness on the left but that is new. She also has some atrophy on the foot which is new. She also notes that the foot pad has become enlarged and is causing a pressure point on that left side. She continues to wear special tissues to help with her symptoms. She takes Lyrica at low dose at night which mostly helps her with sleeping. Patient is also having some symptoms in her hands with vascular changes. Her metatarsalgia only occurs when walking on hard surfaces.  
 
Recap: 
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 She was seen by Dr. Yuliana Payan the podiatrist in [place]. It was recommended that she try Lyrica. Patient had previously been prescribed this but has not tried it. She started on the Lyrica and noticed that it did seem to help her with sleeping but does not seem to have any effect on her feet. Patient continues to have numbness in the feet as well as pain from the metatarsalgia. She feels like the Lyrica at 100 mg twice a day was too sedating. She changed to 100 mg just at night. With this dose she does have some cognitive issues the following day, but is sleeping well. She would like to adjust this further if possible. Since last visit patient has also had repeated blood work. This showed an elevated CK. We discussed this in detail today. Patient reports her level has run in the 300s despite being on or off a statin medication. She is also currently not on any type of PPI. Patient reports that she does have some muscle fatigue especially in the morning and especially with walking up steps. Patient is also concerned about possible autonomic insufficiency. Due to the changes in temperature in her hands and feet. She also had a low reading on her oxygen monitor date 95% but this could have been related to her temperature changes in the hands. Patient has had previous workup at [facility] as detailed in my previous note. She has had prior testing with an EMG/NCS. We discussed that she has not had autonomic testing at this point. We also reviewed her labs and will order several other tests today. Since last visit she did have an MRI of the lumbar spine. This showed moderate central stenosis at L4/5. At this time patient's symptoms do not seem related to the stenosis. Plan is to hold on any type of neurosurgery referral. 
 
Objective:  
ROS: 
Per HPI- 
Otherwise 12 point ROS was negative Meds: Current Outpatient Prescriptions on File Prior to Visit Medication Sig Dispense Refill  pregabalin (LYRICA) 50 mg capsule Take 1 Cap by mouth nightly. Max Daily Amount: 50 mg. 30 Cap 5  
 valACYclovir (VALTREX) 1 gram tablet 500 mg.    
 metFORMIN ER (GLUCOPHAGE XR) 500 mg tablet Take 1 Tab by mouth daily (with dinner). 30 Tab 11  
 co-enzyme Q-10 (CO Q-10) 100 mg capsule Take 100 mg by mouth daily.  Glucosamine &Chondroit-MV-Min3 150-656-78-0.5 mg tab Take  by mouth.  raNITIdine (ZANTAC) 300 mg tablet Take 300 mg by mouth two (2) times a day.  multivitamin (ONE A DAY) tablet Take 1 Tab by mouth daily.  aspirin (ASPIRIN) 325 mg tablet Take 325 mg by mouth daily.  magnesium oxide 500 mg tab Take 500 mg by mouth daily.  cholecalciferol, vitamin D3, (VITAMIN D3) 2,000 unit tab Take  by mouth daily.  calcium carbonate (CALTREX) 600 mg (1,500 mg) tablet Take 600 mg by mouth daily.  fish oil-dha-epa 1,200-144-216 mg cap Take 2 Tabs by mouth daily.  atorvastatin (LIPITOR) 10 mg tablet Take 5 mg by mouth daily. No current facility-administered medications on file prior to visit. Imaging: MRI L-spine: Moderate central stenosis at L4/5 with diffuse disc bulging centrally and towards the left with nerve root compression in the region of the foramen. There is also facet arthropathy at L5-S1. Reviewed records in [facility] and [facility] tab today Lab Review Results for orders placed or performed in visit on 09/15/17 MYASTHENIA GRAVIS PANEL (PROFILE II) Result Value Ref Range AChR Binding Ab, serum 0.03 0.00 - 0.24 nmol/L Anti-striation Ab Negative Neg:<1:40 CK Result Value Ref Range Creatine Kinase,Total 427 (H) 24 - 173 U/L ALDOLASE Result Value Ref Range Aldolase 6.3 3.3 - 10.3 U/L  
LD Result Value Ref Range  (H) 119 - 226 IU/L THYROID ANTIBODY PANEL Result Value Ref Range Thyroid peroxidase Ab 26 0 - 34 IU/mL Thyroglobulin Ab <1.0 0.0 - 0.9 IU/mL  
EMG/NCS Impression: ABNORMAL 
  
Extensive electrodiagnostic examination of the left and right lower extremities shows the followin) Intact bilateral sural sensory responses. Slightly reduced left peroneal sensory amplitude response compared to the right, which can be technical in nature. 2) Reduced left peroneal motor amplitude response compared to the right 2) Chronic, without active, motor axon loss changes in muscles innervated by left L5 root/segment 
  
These findings are suggestive of an underlying chronic, left L5 motor radiculopathy. Recommend radiological correlation. 
  
No myopathic looking motor units were noted. 
  
Electrodiagnostic examination of the right lower extremity is within normal limits. 
   
 
Exam: 
Visit Vitals  /62  Pulse 75  Temp 98.7 °F (37.1 °C) (Oral)  Resp 18  Ht 5' 7\" (1.702 m)  Wt 53.7 kg (118 lb 6.4 oz)  SpO2 98%  BMI 18.54 kg/m2 Gen: Well developed CV: RRR Lungs: non labored breathing Abd: non distending Neuro: A&O x 3, no dysarthria or aphasia CN II-XII: PERRL, EOMI, face symmetric, tongue/palate midline Motor: strength 5/5 all four ext Sensory: intact to LT / decreased pinprick left heel Gait: normal  
Skin: Vascular changes in the feet with color change from white to red to purple and a blanching effect with pressure Assessment:  
Jay Obando is a 59 y.o. female who presents for follow up of small fiber neuropathy. Today we also discussed patient's symptoms again. Reviewed previous workup again. She does have elevated CK chronically. This has had some evaluation but not thoroughly. Repeat labs showed continued elevated CK. EMG/NCS showed evidence of a chronic L5 radiculopathy but no evidence for neuropathy or  Myopathy. Discussed patient with Dr. Rob Lassiter who is her neurologist at [facility]. He would like to order a sodium channel neuropathy panel. Patient is agreeable to this and will send the kid to her home. Additionally patient gives him access to our medical record so that he can review her most recent EMG/NCS. Plan: 1. MRI of the lumbar spine as above. No referrals needed at this time. 2.  EMG/NCS to evaluate for myopathy was negative for this which did show chronic radiculopathy 3. Myasthenia gravis panel, CK, aldolase, LDH, thyroglobulin antibodies within normal limits 4. No need for muscle biopsy at this point 5. We will do autonomic testing when available 6. Continue Lyrica 50 mg nightly. 7.  Discussed trying Lamictal as this may have some effect on her sodium channels. Patient would like to wait for testing prior to initiating this. Follow-up TBD based on testing Signed: 
Uriel Osorio MD 
11/29/2017 This note was created using voice recognition software. Despite editing, there may be syntax errors. This note will not be viewable in [address]

## 2017-11-29 NOTE — PATIENT INSTRUCTIONS
[facility]   Statement to Patients  April 1, 2014      In an effort to ensure the large volume of patient prescription refills is processed in the most efficient and expeditious manner, we are asking our patients to assist us by calling your Pharmacy for all prescription refills, this will include also your  Mail Order Pharmacy. The pharmacy will contact our office electronically to continue the refill process. Please do not wait until the last minute to call your pharmacy. We need at least 48 hours (2days) to fill prescriptions. We also encourage you to call your pharmacy before going to  your prescription to make sure it is ready. With regard to controlled substance prescription refill requests (narcotic refills) that need to be picked up at our office, we ask your cooperation by providing us with at least 72 hours (3days) notice that you will need a refill. We will not refill narcotic prescription refill requests after 4:00pm on any weekday, Monday through Thursday, or after 2:00pm on Fridays, or on the weekends. We encourage everyone to explore another way of getting your prescription refill request processed using Mir[employer], our patient web portal through our electronic medical record system. Mir[employer]ore is an efficient and effective way to communicate your medication request directly to the office and  downloadable as an veda on your smart phone . Mir[employer]ore also features a review functionality that allows you to view your medication list as well as leave messages for your physician. Are you ready to get connected? If so please review the attatched instructions or speak to any of our staff to get you set up right away! Thank you so much for your cooperation. Should you have any questions please contact our Practice Administrator.     The Physicians and Staff,  [address] [place] [address] Neurology Clinic           Patient Instructions/Plans:  Lamotrigine (By mouth) Lamotrigine ([id number]-tri-akhil)  Treats seizures and bipolar disorder. Brand Name(s): LaMICtal, LaMICtal CD, LaMICtal ODT, LaMICtal ODT Patient Titration Kit Blue, LaMICtal ODT Patient Titration Kit [place], LaMICtal ODT Patient Titration Kit Orange, [employer] Kit FastFig, [employer] Kit Green, [employer], LaMICtal XR, LaMICtal XR Patient Titration Kit Blue, LaMICtal XR Patient Titration Kit Green, LaMICtal XR Patient Titration Kit Orange   There may be other brand names for this medicine. When This Medicine Should Not Be Used: This medicine is not right for everyone. Do not use it if you had an allergic reaction to lamotrigine. How to Use This Medicine:   Tablet, Chewable Tablet, Dissolving Tablet, Long Acting Tablet  · Take your medicine as directed. Your dose may need to be changed several times to find what works best for you. · Chewable tablet: You may swallow the tablet whole, or you may chew it and then swallow a small amount of water or diluted fruit juice. You may also dissolve the chewable tablet. To do this, put about 1 teaspoon of water or juice in a glass, drop in the tablet, let it sit for about 1 minute so it dissolves, swirl the glass to mix, and then swallow the entire mixture. · Disintegrating tablet: Make sure your hands are dry before you handle the tablet. Place the tablet on your tongue. Move the tablet around in your mouth so it dissolves. · Regular tablet: Swallow the tablet whole. You may break or crush the tablet if your doctor tells you to, but the medicine might leave a bitter taste in your mouth. · Swallow the extended-release tablet whole. Do not crush, break, or chew it. · This medicine should come with a Medication Guide. Ask your pharmacist for a copy if you do not have one. · Missed dose: Take a dose as soon as you remember. If it is almost time for your next dose, wait until then and take a regular dose.  Do not take extra medicine to make up for a missed dose. · Store the medicine in a closed container at room temperature, away from heat, moisture, and direct light. Do not use if the blister pack is torn or broken. Drugs and Foods to Avoid:   Ask your doctor or pharmacist before using any other medicine, including over-the-counter medicines, vitamins, and herbal products. · Some foods and medicines can affect how lamotrigine works. Tell your doctor if you are using any of the following:  ¨ Atazanavir, ritonavir, lopinavir, rifampin  ¨ Birth control pills  ¨ Other medicine to control seizures, including carbamazepine, divalproex, oxcarbazepine, phenobarbital, phenytoin, primidone, valproic acid, valproate  Warnings While Using This Medicine:   · Tell your doctor if you are pregnant or breastfeeding, or if you have kidney disease, liver disease, or a history of depression. Tell your doctor if you have had a rash or an allergic reaction to other seizure medicine. · This medicine may cause the following problems:  ¨ Drug reaction with eosinophilia and systemic symptoms (DRESS), which may damage organs such as the liver, kidney, or heart  ¨ Increased risk of thoughts of suicide or other serious mood changes  ¨ Low blood cell counts, which may cause bleeding problems or increase your risk for infection  ¨ Meningitis  ¨ Severe skin rash that can lead to hospitalization or death  · This medicine may make you dizzy or drowsy. Do not drive or do anything else that could be dangerous until you know how this medicine affects you. · Do not stop using this medicine suddenly. Your doctor will need to slowly decrease your dose before you stop it completely. · Your doctor will do lab tests at regular visits to check on the effects of this medicine. Keep all appointments. · Keep all medicine out of the reach of children. Never share your medicine with anyone.   Possible Side Effects While Using This Medicine:   Call your doctor right away if you notice any of these side effects:  · Allergic reaction: Itching or hives, swelling in your face or hands, swelling or tingling in your mouth or throat, chest tightness, trouble breathing  · Blistering, peeling, or red skin rash  · Feeling depressed, irritable, or restless  · Fever, chills, cough, sore throat, and body aches  · Fever, skin rash, or swollen glands in your armpits, neck, or groin  · Painful sores in your mouth or around your eyes  · Stiff neck or back, headache, fever, nausea, vomiting  · Thoughts of hurting yourself, other unusual thoughts or behaviors  · Unusual bleeding, bruising, or weakness  · Yellow skin or eyes  If you notice these less serious side effects, talk with your doctor:   · Blurred vision, double vision, or other vision problems  · Clumsiness, dizziness, sleepiness, problems with balance or walking  · Nausea, vomiting  · Runny or stuffy nose  If you notice other side effects that you think are caused by this medicine, tell your doctor. Call your doctor for medical advice about side effects. You may report side effects to FDA at 3-009-FDA-5629  © 2017 [address] [employer] Information is for End User's use only and may not be sold, redistributed or otherwise used for commercial purposes. The above information is an  only. It is not intended as medical advice for individual conditions or treatments. Talk to your doctor, nurse or pharmacist before following any medical regimen to see if it is safe and effective for you.

## 2017-11-29 NOTE — PROGRESS NOTES
Neurology Progress Note    Patient ID:  Megha Lawrence  [id number]  2 y.o.  1960    HISTORY PROVIDED BY:  Patient      Chief Complaint: Neuropathy  Subjective:    Ms. Leopold Regan is here for follow up today of suspected small fiber neuropathy secondary to prediabetes. 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 Patient is to follow at [facility]. I did speak with her neurologist there and discussed the case. Since my last visit patient did have a repeat EMG/NCS. This showed some  Evidence for chronic L5 radiculopathy. Patient does have a lumbar stenosis at L4/5 but this is moderate. Does not seem to fit with her current symptoms. Patient does report that her pain again is in her metatarsals. However when she has the significant vascular changes there is no pain. Therefore she feels like other with her myalgia may not be the right terms for her. She notes that temperature and change in pressure affect her vasculature. Her refill is very slow. Her left foot is much worse in her right foot. She has a heel numbness on the left but that is new. She also has some atrophy on the foot which is new. She also notes that the foot pad has become enlarged and is causing a pressure point on that left side. She continues to wear special tissues to help with her symptoms. She takes Lyrica at low dose at night which mostly helps her with sleeping. Patient is also having some symptoms in her hands with vascular changes. Her metatarsalgia only occurs when walking on hard surfaces. Recap:  Since last visit patient is continued to pursue evaluation for her erythromelalgia and metatarsalgia. She was seen by Dr. Zainab Ackerman the podiatrist in [place]. It was recommended that she try Lyrica. Patient had previously been prescribed this but has not tried it.   She started on the Lyrica and noticed that it did seem to help her with sleeping but does not seem to have any effect on her feet. Patient continues to have numbness in the feet as well as pain from the metatarsalgia. She feels like the Lyrica at 100 mg twice a day was too sedating. She changed to 100 mg just at night. With this dose she does have some cognitive issues the following day, but is sleeping well. She would like to adjust this further if possible. Since last visit patient has also had repeated blood work. This showed an elevated CK. We discussed this in detail today. Patient reports her level has run in the 300s despite being on or off a statin medication. She is also currently not on any type of PPI. Patient reports that she does have some muscle fatigue especially in the morning and especially with walking up steps. Patient is also concerned about possible autonomic insufficiency. Due to the changes in temperature in her hands and feet. She also had a low reading on her oxygen monitor date 95% but this could have been related to her temperature changes in the hands. Patient has had previous workup at [facility] as detailed in my previous note. She has had prior testing with an EMG/NCS. We discussed that she has not had autonomic testing at this point. We also reviewed her labs and will order several other tests today. Since last visit she did have an MRI of the lumbar spine. This showed moderate central stenosis at L4/5. At this time patient's symptoms do not seem related to the stenosis. Plan is to hold on any type of neurosurgery referral.    Objective:   ROS:  Per HPI-  Otherwise 12 point ROS was negative    Meds:  Current Outpatient Prescriptions on File Prior to Visit   Medication Sig Dispense Refill    pregabalin (LYRICA) 50 mg capsule Take 1 Cap by mouth nightly. Max Daily Amount: 50 mg. 30 Cap 5    valACYclovir (VALTREX) 1 gram tablet 500 mg.      metFORMIN ER (GLUCOPHAGE XR) 500 mg tablet Take 1 Tab by mouth daily (with dinner).  30 Tab 11    co-enzyme Q-10 (CO Q-10) 100 mg capsule Take 100 mg by mouth daily.  Glucosamine &Chondroit-MV-Min3 121-532-82-0.5 mg tab Take  by mouth.  raNITIdine (ZANTAC) 300 mg tablet Take 300 mg by mouth two (2) times a day.  multivitamin (ONE A DAY) tablet Take 1 Tab by mouth daily.  aspirin (ASPIRIN) 325 mg tablet Take 325 mg by mouth daily.  magnesium oxide 500 mg tab Take 500 mg by mouth daily.  cholecalciferol, vitamin D3, (VITAMIN D3) 2,000 unit tab Take  by mouth daily.  calcium carbonate (CALTREX) 600 mg (1,500 mg) tablet Take 600 mg by mouth daily.  fish oil-dha-epa 1,200-144-216 mg cap Take 2 Tabs by mouth daily.  atorvastatin (LIPITOR) 10 mg tablet Take 5 mg by mouth daily. No current facility-administered medications on file prior to visit. Imaging:  MRI L-spine: Moderate central stenosis at L4/5 with diffuse disc bulging centrally and towards the left with nerve root compression in the region of the foramen. There is also facet arthropathy at L5-S1. Reviewed records in [employer] and [employer] tab today    Lab Review   Results for orders placed or performed in visit on 09/15/17   MYASTHENIA GRAVIS PANEL (PROFILE II)   Result Value Ref Range    AChR Binding Ab, serum 0.03 0.00 - 0.24 nmol/L    Anti-striation Ab Negative Neg:<1:40   CK   Result Value Ref Range    Creatine Kinase,Total 427 (H) 24 - 173 U/L   ALDOLASE   Result Value Ref Range    Aldolase 6.3 3.3 - 10.3 U/L   LD   Result Value Ref Range     (H) 119 - 226 IU/L   THYROID ANTIBODY PANEL   Result Value Ref Range    Thyroid peroxidase Ab 26 0 - 34 IU/mL    Thyroglobulin Ab <1.0 0.0 - 0.9 IU/mL   EMG/NCS  Impression:  ABNORMAL     Extensive electrodiagnostic examination of the left and right lower extremities shows the followin) Intact bilateral sural sensory responses.  Slightly reduced left peroneal sensory amplitude response compared to the right, which can be technical in nature. 2) Reduced left peroneal motor amplitude response compared to the right  2) Chronic, without active, motor axon loss changes in muscles innervated by left L5 root/segment     These findings are suggestive of an underlying chronic, left L5 motor radiculopathy. Recommend radiological correlation.     No myopathic looking motor units were noted.     Electrodiagnostic examination of the right lower extremity is within normal limits.        Exam:  Visit Vitals    /62    Pulse 75    Temp 98.7 °F (37.1 °C) (Oral)    Resp 18    Ht 5' 7\" (1.702 m)    Wt 53.7 kg (118 lb 6.4 oz)    SpO2 98%    BMI 18.54 kg/m2     Gen: Well developed  CV: RRR  Lungs: non labored breathing  Abd: non distending  Neuro: A&O x 3, no dysarthria or aphasia  CN II-XII: PERRL, EOMI, face symmetric, tongue/palate midline  Motor: strength 5/5 all four ext  Sensory: intact to LT / decreased pinprick left heel  Gait: normal   Skin: Vascular changes in the feet with color change from white to red to purple and a blanching effect with pressure    Assessment:   Magnus Mendiola is a 59 y.o. female who presents for follow up of small fiber neuropathy. Today we also discussed patient's symptoms again. Reviewed previous workup again. She does have elevated CK chronically. This has had some evaluation but not thoroughly. Repeat labs showed continued elevated CK. EMG/NCS showed evidence of a chronic L5 radiculopathy but no evidence for neuropathy or  Myopathy. Discussed patient with Dr. Radha Matthews who is her neurologist at [facility]. He would like to order a sodium channel neuropathy panel. Patient is agreeable to this and will send the kid to her home. Additionally patient gives him access to our medical record so that he can review her most recent EMG/NCS. Plan:   1. MRI of the lumbar spine as above. No referrals needed at this time.   2.  EMG/NCS to evaluate for myopathy was negative for this which did show chronic radiculopathy  3. Myasthenia gravis panel, CK, aldolase, LDH, thyroglobulin antibodies within normal limits  4. No need for muscle biopsy at this point  5. We will do autonomic testing when available  6. Continue Lyrica 50 mg nightly. 7.  Discussed trying Lamictal as this may have some effect on her sodium channels. Patient would like to wait for testing prior to initiating this. Follow-up TBD based on testing    Signed:  Yamilka Watkins MD  11/29/2017    This note was created using voice recognition software. Despite editing, there may be syntax errors. This note will not be viewable in [address]

## 2017-11-30 RX ORDER — DICLOFENAC SODIUM 10 MG/G
4 GEL TOPICAL 4 TIMES DAILY
Qty: 1 EACH | Refills: 3 | Status: SHIPPED | OUTPATIENT
Start: 2017-11-30 | End: 2018-09-28

## 2017-12-27 LAB
EST. AVERAGE GLUCOSE BLD GHB EST-MCNC: 108 MG/DL
HBA1C MFR BLD: 5.4 % (ref 4.8–5.6)

## 2018-01-04 ENCOUNTER — PATIENT MESSAGE (OUTPATIENT)
Dept: NEUROLOGY | Age: 58
End: 2018-01-04

## 2018-01-11 NOTE — TELEPHONE ENCOUNTER
From: Breanne Johnson  To: Singh Russ MD  Sent: 1/4/2018 11:45 AM EST  Subject: Test Results Question    Hi Dr. Janina Jackson has given the genetic test results to Dr. Luciana Suarez. Are you getting the results from him to discuss with me, or should I email him? He ordered a Comprehensive Neuropathies Panel and a Transthyretin Amyloidosis Test (TTR). I asked [facility] to get permission from him to give me an copy of the results. Also, can your office mail me or email me a copy of the [facility] EMG? It came through as sort of a jumble on the Portal and is hard to read. Now that you've seen those results, do you have any comment on the differences between the results that Dr. Dennis Quinn got? Edith called the peroneal motor issue a mononeuropathy (could that be local?) and made no mention of any changes having to do with a radiculopathy. Thank you!

## 2018-03-24 LAB
BUN SERPL-MCNC: 24 MG/DL (ref 6–24)
BUN/CREAT SERPL: 32 (ref 9–23)
CALCIUM SERPL-MCNC: 10.3 MG/DL (ref 8.7–10.2)
CHLORIDE SERPL-SCNC: 101 MMOL/L (ref 96–106)
CHOLEST SERPL-MCNC: 175 MG/DL (ref 100–199)
CO2 SERPL-SCNC: 28 MMOL/L (ref 18–29)
CREAT SERPL-MCNC: 0.74 MG/DL (ref 0.57–1)
EST. AVERAGE GLUCOSE BLD GHB EST-MCNC: 108 MG/DL
GFR SERPLBLD CREATININE-BSD FMLA CKD-EPI: 103 ML/MIN/1.73
GFR SERPLBLD CREATININE-BSD FMLA CKD-EPI: 89 ML/MIN/1.73
GLUCOSE SERPL-MCNC: 88 MG/DL (ref 65–99)
HBA1C MFR BLD: 5.4 % (ref 4.8–5.6)
HDL SERPL-SCNC: 44.8 UMOL/L
HDLC SERPL-MCNC: 84 MG/DL
INTERPRETATION, 910389: NORMAL
LDL SERPL QN: 20.9 NM
LDL SERPL-SCNC: 723 NMOL/L
LDL SMALL SERPL-SCNC: 248 NMOL/L
LDLC SERPL CALC-MCNC: 79 MG/DL (ref 0–99)
LP-IR SCORE SERPL: <25
POTASSIUM SERPL-SCNC: 4.7 MMOL/L (ref 3.5–5.2)
SODIUM SERPL-SCNC: 143 MMOL/L (ref 134–144)
TRIGL SERPL-MCNC: 62 MG/DL (ref 0–149)

## 2018-03-30 ENCOUNTER — OFFICE VISIT (OUTPATIENT)
Dept: ENDOCRINOLOGY | Age: 58
End: 2018-03-30

## 2018-03-30 VITALS
DIASTOLIC BLOOD PRESSURE: 67 MMHG | WEIGHT: 117.2 LBS | SYSTOLIC BLOOD PRESSURE: 99 MMHG | HEIGHT: 67 IN | BODY MASS INDEX: 18.39 KG/M2 | HEART RATE: 82 BPM

## 2018-03-30 DIAGNOSIS — E83.52 HYPERCALCEMIA: ICD-10-CM

## 2018-03-30 DIAGNOSIS — R74.8 ELEVATED CK: ICD-10-CM

## 2018-03-30 DIAGNOSIS — E78.5 HYPERLIPIDEMIA LDL GOAL <100: ICD-10-CM

## 2018-03-30 DIAGNOSIS — R73.02 IMPAIRED GLUCOSE TOLERANCE: Primary | ICD-10-CM

## 2018-03-30 RX ORDER — SITAGLIPTIN AND METFORMIN HYDROCHLORIDE 50; 500 MG/1; MG/1
TABLET, FILM COATED, EXTENDED RELEASE ORAL
Qty: 30 TAB | Refills: 11 | Status: SHIPPED | OUTPATIENT
Start: 2018-03-30 | End: 2018-09-28 | Stop reason: ALTCHOICE

## 2018-03-30 NOTE — PROGRESS NOTES
Chief Complaint   Patient presents with    Diabetes     pcp and pharmacy confirmed     History of Present Illness: Aubree Madden is a 59 y.o. female here for follow up of pre-diabetes. Weight stable lbs since last visit in 9/17. Since last visit did end up tapering off the lyrica about 3 weeks ago. Went for some repeat peripheral arterial testing and had this done on 3/23/18 and this showed relatively normal results aside from some mild reduced to the great toes. Still taking one metformin per day in the evening. Has been getting this everyday. Still tends to check sugar after eating lunch and finds that most of her readings are between  but does have some in the 150-160s especially after having stress that day. Did have one up to 191 the day of her EMG. Takes 3-4 tabs of calcium per day plus what is in her mvi. Has had increased urination during the day. Would like to have a urinalysis to check her specific gravity today so I will order this. Also would like her CK repeated as it was higher when Dr. Amanda Banda karen it 4 days after I drew it in 9/17. No history of kidney stones. Current Outpatient Prescriptions   Medication Sig    CALCIUM CARB/VIT D2/MINERALS (CALCIUM CITRATE + PO) Take 1,200 mg PE/kg/day by mouth daily.  diclofenac (VOLTAREN) 1 % gel Apply 4 g to affected area four (4) times daily.  valACYclovir (VALTREX) 1 gram tablet 500 mg.    metFORMIN ER (GLUCOPHAGE XR) 500 mg tablet Take 1 Tab by mouth daily (with dinner).  co-enzyme Q-10 (CO Q-10) 100 mg capsule Take 100 mg by mouth daily.  Glucosamine &Chondroit-MV-Min3 633-483-32-0.5 mg tab Take  by mouth.  raNITIdine (ZANTAC) 300 mg tablet Take 300 mg by mouth two (2) times a day.  multivitamin (ONE A DAY) tablet Take 1 Tab by mouth daily.  aspirin (ASPIRIN) 325 mg tablet Take 325 mg by mouth daily.  magnesium oxide 500 mg tab Take 500 mg by mouth daily.     cholecalciferol, vitamin D3, (VITAMIN D3) 2,000 unit tab Take  by mouth daily.  fish oil-dha-epa 1,200-144-216 mg cap Take 2 Tabs by mouth daily.  atorvastatin (LIPITOR) 10 mg tablet Take 5 mg by mouth daily. No current facility-administered medications for this visit. No Known Allergies     Review of Systems:  - Eyes: no blurry vision or double vision  - Cardiovascular: no chest pain  - Respiratory: no shortness of breath  - Musculoskeletal: no myalgias  - Neurological: no numbness/tingling in extremities    Physical Examination:  Blood pressure 99/67, pulse 82, height 5' 7\" (1.702 m), weight 117 lb 3.2 oz (53.2 kg). - General: pleasant, no distress, good eye contact   - Neck: no carotid bruits  - Cardiovascular: regular, normal rate, nl s1 and s2, no m/r/g,   - Respiratory: clear bilaterally  - Integumentary: no edema,   - Psychiatric: normal mood and affect    Data Reviewed:   Component      Latest Ref Rng & Units 3/23/2018 3/23/2018 3/23/2018           9:26 AM  9:26 AM  9:26 AM   Glucose      65 - 99 mg/dL  88    BUN      6 - 24 mg/dL  24    Creatinine      0.57 - 1.00 mg/dL  0.74    GFR est non-AA      >59 mL/min/1.73  89    GFR est AA      >59 mL/min/1.73  103    BUN/Creatinine ratio      9 - 23  32 (H)    Sodium      134 - 144 mmol/L  143    Potassium      3.5 - 5.2 mmol/L  4.7    Chloride      96 - 106 mmol/L  101    CO2      18 - 29 mmol/L  28    Calcium      8.7 - 10.2 mg/dL  10.3 (H)    LDL-P      <1000 nmol/L   723   LDL-C      0 - 99 mg/dL   79   HDL-C      >39 mg/dL   84   Triglycerides      0 - 149 mg/dL   62   Cholesterol, total      100 - 199 mg/dL   175   HDL-P (Total)      >=30.5 umol/L   44.8   Small LDL-P      <=527 nmol/L   248   LDL size      >20.5 nm   20.9   LP-IR SCORE      <=45   <25   Hemoglobin A1c, (calculated)      4.8 - 5.6 % 5.4     Estimated average glucose      mg/dL 108         Assessment/Plan:     1.  Impaired glucose tolerance: Has had mildly elevated A1c levels since early 2015 when it was 5.9% and then 5.5% in 8/15, 5.9% in 4/16 and 5.5% in 8/16. Had a 3 hour glucose tolerance tolerance test in 11/15 that showed a fasting sugar of 80, 1 hour of 121, 2 hour of 55 and 3 hour of 49. Her A1c was 5.6% at [address] when I met her in 11/16. It was unclear if she could be having intermittent spikes in her glucose that are contributing to her peripheral neuropathy so she started testing her sugars after eating and did identify spikes so has cut back to 30g of carbs or less per meal and 15-17g for snacks. Her A1c was down to 5.2% in 3/17 by POC and despite this, is still having numbness in her feet. Her A1c was 5.4% in 9/17 despite starting metformin 500 in 8/17 and continues to have symptoms in her feet. Still 5.4% in 12/17 and 3/18. I'm truly unsure if her fluctuating sugars are contributing to the symptoms she is having but we will give a trial of janumet XR to see if this will help with post-prandial spikes to in anyway keep her from having more damage to her vasculature. - change to janumet XR 50/500 mg daily  - check A1c and bmp prior to next visit      2. Hyperlipidemia LDL goal <100: LDL 68 and  in 11/16 while taking atorva 5 mg and she cut back to 2.5 mg daily to see if this would help her CK and it didn't and her LDL apparently went higher when checked by her cardiologist so she has gone back to 5 mg daily. LDL 79 and  in 9/17. LDL still 79 in 3/18.  - cont atorva 5 mg daily  - check NMR lipid profile prior to next visit      3. Abnormal CK: this had been stable in the 200-400 range but was up to 427 in 9/17 with Dr. Diony Smith so will repeat today  - check CK today and prior to next visit    4. Hypercalcemia: possibly due to too much calcium tabs so will cut back. - decrease citracal to 2 tabs per day  - check bmp and urinalysis today    We spent 40 minutes of face to face time together and > 50% of the time was spent in counseling regarding management of all the conditions above.       Patient Instructions   1) Your calcium was just barely elevated but to be safe, decrease the citracal to no more than 2 tabs per day. 2) Your Hemoglobin A1c (3 month test of blood sugar) is stable at 5.4%    3) Your cholesterol is very stable. 4) Try taking janumet XR 50/500 mg once daily in the evening to start as you have been doing in place of the metformin ER. Take the prescription with the co-pay card for a 30 day free trial.  Feel free to move this to the morning to see if you notice any difference in your sugars one time of day or another. If tolerating continue the prescription and use the other co-pay card. 5) I will repeat your calcium and your CK and draw a urinalysis with specific gravity test today and e-mail you the results next week. Follow-up Disposition:  Return in about 6 months (around 9/30/2018).     Copy sent to:  Dr. Tom Davenport in [facility] in [facility] MD Dr. Aliza Arita via [facility]  Dr. Bibi Young    Lab follow up: 3/31/18  Component      Latest Ref Rng & Units 3/30/2018 3/30/2018 3/30/2018           2:37 PM  2:37 PM  2:37 PM   Specific Gravity      1.005 - 1.030  1.028    pH (UA)      5.0 - 7.5  6.0    Color      Yellow  Yellow    Appearance      Clear  Clear    Leukocyte Esterase      Negative  Negative    Protein      Negative/Trace  Negative    Glucose      Negative  Negative    Ketone      Negative  Negative    Blood      Negative  Negative    Bilirubin      Negative  Negative    Urobilinogen      0.2 - 1.0 mg/dL  0.2    Nitrites      Negative  Negative    Microscopic Examination        Comment    Glucose      65 - 99 mg/dL   88   BUN      6 - 24 mg/dL   28 (H)   Creatinine      0.57 - 1.00 mg/dL   0.79   GFR est non-AA      >59 mL/min/1.73   82   GFR est AA      >59 mL/min/1.73   95   BUN/Creatinine ratio      9 - 23   35 (H)   Sodium      134 - 144 mmol/L   143   Potassium      3.5 - 5.2 mmol/L   4.8   Chloride      96 - 106 mmol/L 103   CO2      18 - 29 mmol/L   26   Calcium      8.7 - 10.2 mg/dL   9.9   Creatine Kinase,Total      24 - 173 U/L 351 (H)       Sent her the following message through [employer]:    BUN and creatinine are markers of kidney function. Your BUN was slightly high possibly due to mild dehydration.  --------  Your repeat calcium was back to normal but I still think it makes sense to cut back on your calcium tabs to 2 per day just to be safe.  -------  Your CK is a marker of muscle breakdown. Your value is abnormal at 351 but down from 427 at your last check. ------  Your urine specific gravity is normal as was the rest of your urinalysis.

## 2018-03-30 NOTE — PATIENT INSTRUCTIONS
1) Your calcium was just barely elevated but to be safe, decrease the citracal to no more than 2 tabs per day. 2) Your Hemoglobin A1c (3 month test of blood sugar) is stable at 5.4%    3) Your cholesterol is very stable. 4) Try taking janumet XR 50/500 mg once daily in the evening to start as you have been doing in place of the metformin ER. Take the prescription with the co-pay card for a 30 day free trial.  Feel free to move this to the morning to see if you notice any difference in your sugars one time of day or another. If tolerating continue the prescription and use the other co-pay card. 5) I will repeat your calcium and your CK and draw a urinalysis with specific gravity test today and e-mail you the results next week.

## 2018-03-30 NOTE — MR AVS SNAPSHOT
[address] [facility] [address] [address] [phone] Patient: Sona Martinez MRN: [id number] SUF:[id number][birth year]23/[id number] Visit Information Date & Time Provider Department Dept. Phone Encounter #  
 3/30/2018  1:30 PM Georgette Sal, [address] [place] [address] Diabetes and Endocrinology [id number] Follow-up Instructions Return in about 6 months (around 9/30/2018). Upcoming Health Maintenance Date Due DTaP/Tdap/Td series (1 - Tdap) 4/21/1979 PAP AKA CERVICAL CYTOLOGY 4/21/1979 FOBT Q 1 YEAR AGE 50-75 4/21/2008 ZOSTER VACCINE AGE 60> 2/21/2018 BREAST CANCER SCRN MAMMOGRAM 12/8/2018 Allergies as of 3/30/2018  Review Complete On: 3/30/2018 By: Georgette Sal MD  
 No Known Allergies Current Immunizations  Reviewed on 11/29/2016 Name Date Influenza Vaccine 11/19/2017 Influenza Vaccine (Quad) PF 11/29/2016 Not reviewed this visit You Were Diagnosed With   
  
 Codes Comments Impaired glucose tolerance    -  Primary ICD-10-CM: R73.02 
ICD-9-CM: 790.22 Hyperlipidemia LDL goal <100     ICD-10-CM: E78.5 ICD-9-CM: 272.4 Elevated CK     ICD-10-CM: R74.8 ICD-9-CM: 790.5 Hypercalcemia     ICD-10-CM: D29.27 
ICD-9-CM: 275.42 Vitals BP Pulse Height(growth percentile) Weight(growth percentile) BMI OB Status 99/67 82 5' 7\" (1.702 m) 117 lb 3.2 oz (53.2 kg) 18.36 kg/m2 Menopause Smoking Status Never Smoker Vitals History BMI and BSA Data Body Mass Index Body Surface Area  
 18.36 kg/m 2 1.59 m 2 Preferred Pharmacy Pharmacy Name Phone  [facility] [phone] Your Updated Medication List  
  
   
This list is accurate as of 3/30/18  2:15 PM.  Always use your most recent med list.  
  
  
  
  
 aspirin 325 mg tablet Commonly known as:  ASPIRIN Take 325 mg by mouth daily. atorvastatin 10 mg tablet Commonly known as:  LIPITOR Take 5 mg by mouth daily. CALCIUM CITRATE + PO Take 1,200 mg PE/kg/day by mouth daily. CO Q-10 100 mg capsule Generic drug:  co-enzyme Q-10 Take 100 mg by mouth daily. diclofenac 1 % Gel Commonly known as:  VOLTAREN Apply 4 g to affected area four (4) times daily. fish oil-dha-epa 1,200-144-216 mg Cap Take 2 Tabs by mouth daily. Glucosamine &Chondroit-MV-Min3 650-236-27-0.5 mg Tab Take  by mouth. JANUMET XR  mg Tm24 Generic drug:  SITagliptin-metFORMIN Take 1 tab daily  
  
 magnesium oxide 500 mg Tab Take 500 mg by mouth daily. metFORMIN  mg tablet Commonly known as:  GLUCOPHAGE XR Take 1 Tab by mouth daily (with dinner). multivitamin tablet Commonly known as:  ONE A DAY Take 1 Tab by mouth daily. raNITIdine 300 mg tablet Commonly known as:  ZANTAC Take 300 mg by mouth two (2) times a day. valACYclovir 1 gram tablet Commonly known as:  VALTREX  
500 mg. VITAMIN D3 2,000 unit Tab Generic drug:  cholecalciferol (vitamin D3) Take  by mouth daily. Prescriptions Printed Refills JANUMET XR  mg TM24 11 Sig: Take 1 tab daily Class: Print We Performed the Following CK [id number] CPT(R)] METABOLIC PANEL, BASIC [85971 CPT(R)] URINALYSIS W/ RFLX MICROSCOPIC [04967 CPT(R)] Follow-up Instructions Return in about 6 months (around 9/30/2018). To-Do List   
 09/10/2018 Lab:  CK   
  
 09/10/2018 Lab:  HEMOGLOBIN A1C WITH EAG   
  
 09/10/2018 Lab:  METABOLIC PANEL, BASIC   
  
 09/10/2018 Lab:  Luca Carrizales Patient Instructions 1) Your calcium was just barely elevated but to be safe, decrease the citracal to no more than 2 tabs per day. 2) Your Hemoglobin A1c (3 month test of blood sugar) is stable at 5.4% 3) Your cholesterol is very stable. 4) Try taking janumet XR 50/500 mg once daily in the evening to start as you have been doing in place of the metformin ER. Take the prescription with the co-pay card for a 30 day free trial.  Feel free to move this to the morning to see if you notice any difference in your sugars one time of day or another. If tolerating continue the prescription and use the other co-pay card. 5) I will repeat your calcium and your CK and draw a urinalysis with specific gravity test today and e-mail you the results next week. Introducing [facility] SERVICES! Dear Ji Goldsmith: Thank you for requesting a [employer] account. Our records indicate that you already have an active [employer] account. You can access your account anytime at [email or web address] Did you know that you can access your hospital and ER discharge instructions at any time in [employer]? You can also review all of your test results from your hospital stay or ER visit. Additional Information If you have questions, please visit the Frequently Asked Questions section of the [employer] website at [email or web address] Remember, [employer] is NOT to be used for urgent needs. For medical emergencies, dial 911. Now available from your iPhone and Android! Please provide this summary of care documentation to your next provider. If you have any questions after today's visit, please call [phone].

## 2018-03-31 LAB
APPEARANCE UR: CLEAR
BILIRUB UR QL STRIP: NEGATIVE
BUN SERPL-MCNC: 28 MG/DL (ref 6–24)
BUN/CREAT SERPL: 35 (ref 9–23)
CALCIUM SERPL-MCNC: 9.9 MG/DL (ref 8.7–10.2)
CHLORIDE SERPL-SCNC: 103 MMOL/L (ref 96–106)
CK SERPL-CCNC: 351 U/L (ref 24–173)
CO2 SERPL-SCNC: 26 MMOL/L (ref 18–29)
COLOR UR: YELLOW
CREAT SERPL-MCNC: 0.79 MG/DL (ref 0.57–1)
GFR SERPLBLD CREATININE-BSD FMLA CKD-EPI: 82 ML/MIN/1.73
GFR SERPLBLD CREATININE-BSD FMLA CKD-EPI: 95 ML/MIN/1.73
GLUCOSE SERPL-MCNC: 88 MG/DL (ref 65–99)
GLUCOSE UR QL: NEGATIVE
HGB UR QL STRIP: NEGATIVE
KETONES UR QL STRIP: NEGATIVE
LEUKOCYTE ESTERASE UR QL STRIP: NEGATIVE
MICRO URNS: NORMAL
NITRITE UR QL STRIP: NEGATIVE
PH UR STRIP: 6 [PH] (ref 5–7.5)
POTASSIUM SERPL-SCNC: 4.8 MMOL/L (ref 3.5–5.2)
PROT UR QL STRIP: NEGATIVE
SODIUM SERPL-SCNC: 143 MMOL/L (ref 134–144)
SP GR UR: 1.03 (ref 1–1.03)
UROBILINOGEN UR STRIP-MCNC: 0.2 MG/DL (ref 0.2–1)

## 2018-05-17 RX ORDER — METFORMIN HYDROCHLORIDE 500 MG/1
500 TABLET, EXTENDED RELEASE ORAL
Qty: 30 TAB | Refills: 11 | Status: SHIPPED | OUTPATIENT
Start: 2018-05-17 | End: 2018-09-28

## 2018-05-17 NOTE — TELEPHONE ENCOUNTER
From: Boy Diaz  To: Brittany Avila MD  Sent: 2018 2:47 PM EDT  Subject: Medication Renewal Request    Original authorizing provider: MD Dori Garcia Jose Alaniz would like a refill of the following medications:  metFORMIN ER (GLUCOPHAGE XR) 500 mg tablet Brittany Avila MD]    Preferred pharmacy: [facility]:  Hi Dr. Manny Bautista- Can you please refill the Metformin by itself? I am currently taking the Janumet XR only. I think it is actually helping lunchtime post-prandial spikes, but I have very pronounced daily fatigue right now. This may have begun just prior to starting the [place] so it may be unrelated, but I thought I would stop the Janumet for a week or so and see if the fatigue improves. Thank you- Pharmacy is [facility] in [place]. My last metformin prescription  on 3/31/2018.  Sascha Bishop

## 2018-09-21 LAB
BUN SERPL-MCNC: 26 MG/DL (ref 8–27)
BUN/CREAT SERPL: 30 (ref 12–28)
CALCIUM SERPL-MCNC: 10.2 MG/DL (ref 8.7–10.3)
CHLORIDE SERPL-SCNC: 104 MMOL/L (ref 96–106)
CHOLEST SERPL-MCNC: 167 MG/DL (ref 100–199)
CK SERPL-CCNC: 262 U/L (ref 24–173)
CO2 SERPL-SCNC: 25 MMOL/L (ref 20–29)
CREAT SERPL-MCNC: 0.87 MG/DL (ref 0.57–1)
EST. AVERAGE GLUCOSE BLD GHB EST-MCNC: 105 MG/DL
GLUCOSE SERPL-MCNC: 74 MG/DL (ref 65–99)
HBA1C MFR BLD: 5.3 % (ref 4.8–5.6)
HDL SERPL-SCNC: 42.7 UMOL/L
HDLC SERPL-MCNC: 89 MG/DL
INTERPRETATION, 910389: NORMAL
LDL SERPL QN: 21.2 NM
LDL SERPL-SCNC: 789 NMOL/L
LDL SMALL SERPL-SCNC: <90 NMOL/L
LDLC SERPL CALC-MCNC: 69 MG/DL (ref 0–99)
LP-IR SCORE SERPL: <25
POTASSIUM SERPL-SCNC: 4.5 MMOL/L (ref 3.5–5.2)
SODIUM SERPL-SCNC: 143 MMOL/L (ref 134–144)
TRIGL SERPL-MCNC: 45 MG/DL (ref 0–149)

## 2018-09-28 ENCOUNTER — OFFICE VISIT (OUTPATIENT)
Dept: ENDOCRINOLOGY | Age: 58
End: 2018-09-28

## 2018-09-28 VITALS
BODY MASS INDEX: 18.24 KG/M2 | SYSTOLIC BLOOD PRESSURE: 101 MMHG | HEIGHT: 67 IN | WEIGHT: 116.2 LBS | DIASTOLIC BLOOD PRESSURE: 70 MMHG | HEART RATE: 70 BPM

## 2018-09-28 DIAGNOSIS — R73.02 IMPAIRED GLUCOSE TOLERANCE: Primary | ICD-10-CM

## 2018-09-28 DIAGNOSIS — R74.8 ELEVATED CK: ICD-10-CM

## 2018-09-28 DIAGNOSIS — E78.5 HYPERLIPIDEMIA LDL GOAL <100: ICD-10-CM

## 2018-09-28 DIAGNOSIS — E83.52 HYPERCALCEMIA: ICD-10-CM

## 2018-09-28 RX ORDER — FLASH GLUCOSE SENSOR
KIT MISCELLANEOUS
Qty: 1 EACH | Refills: 0 | Status: SHIPPED | OUTPATIENT
Start: 2018-09-28 | End: 2019-03-29

## 2018-09-28 RX ORDER — FLASH GLUCOSE SENSOR
KIT MISCELLANEOUS
Qty: 3 KIT | Refills: 11 | Status: SHIPPED | OUTPATIENT
Start: 2018-09-28 | End: 2019-03-29

## 2018-09-28 RX ORDER — METFORMIN HYDROCHLORIDE 500 MG/1
500 TABLET, EXTENDED RELEASE ORAL
Qty: 30 TAB | Refills: 11 | COMMUNITY
Start: 2018-09-28 | End: 2019-05-21 | Stop reason: SDUPTHER

## 2018-09-28 NOTE — PROGRESS NOTES
Chief Complaint   Patient presents with    Other     pre-diabetes    Other     no pcp and pharmacy confirmed     History of Present Illness: Tomeka uNnez is a 59 y.o. female here for follow up of diabetes. Weight down 1 lbs since last visit in 3/18. Did start the janumet XR after last visit and was getting fasting sugars in the 79-90 range and post-lunch readings under 120 during April and May. Was having some fatigue and wasn't sure if this was from the janumet so she stopped this for a week and went back to plain metformin ER and didn't notice a difference in fatigue so went back to the janumet XR. Over the past month has had more fasting sugars in the  range for unclear reasons. Now her post-lunch readings are back up to the 140s. She would like to try the freestyle kari so I gave her a prescription to price out the cost.  Her insurance will not longer cover the NMR lipid testing so we'll just order the standard test next time. Compliant with lipitor. Did cut back to 2 tabs of calcium per day. Current Outpatient Prescriptions   Medication Sig    CALCIUM CARB/VIT D2/MINERALS (CALCIUM CITRATE + PO) Take  by mouth daily. 2 tabs daily    JANUMET XR  mg TM24 Take 1 tab daily    valACYclovir (VALTREX) 1 gram tablet 500 mg daily.  co-enzyme Q-10 (CO Q-10) 100 mg capsule Take 100 mg by mouth daily.  Glucosamine &Chondroit-MV-Min3 743-402-58-0.5 mg tab Take  by mouth.  raNITIdine (ZANTAC) 300 mg tablet Take 300 mg by mouth two (2) times a day.  multivitamin (ONE A DAY) tablet Take 1 Tab by mouth daily.  aspirin (ASPIRIN) 325 mg tablet Take 325 mg by mouth daily.  magnesium oxide 500 mg tab Take 500 mg by mouth daily.  cholecalciferol, vitamin D3, (VITAMIN D3) 2,000 unit tab Take  by mouth daily.  fish oil-dha-epa 1,200-144-216 mg cap Take 2 Tabs by mouth daily.  atorvastatin (LIPITOR) 10 mg tablet Take 5 mg by mouth daily.      No current facility-administered medications for this visit. No Known Allergies     Review of Systems:  - Eyes: no blurry vision or double vision  - Cardiovascular: no chest pain  - Respiratory: no shortness of breath  - Musculoskeletal: no myalgias  - Neurological: no numbness/tingling in extremities    Physical Examination:  Blood pressure 101/70, pulse 70, height 5' 7\" (1.702 m), weight 116 lb 3.2 oz (52.7 kg). - General: pleasant, no distress, good eye contact   - Neck: no carotid bruits  - Cardiovascular: regular, normal rate, nl s1 and s2, no m/r/g,   - Respiratory: clear bilaterally  - Integumentary: no edema,   - Psychiatric: normal mood and affect    Data Reviewed:   Component      Latest Ref Rng & Units 9/20/2018 9/20/2018 9/20/2018 9/20/2018          10:05 AM 10:05 AM 10:05 AM 10:05 AM   Glucose      65 - 99 mg/dL   74    BUN      8 - 27 mg/dL   26    Creatinine      0.57 - 1.00 mg/dL   0.87    GFR est non-AA      >59 mL/min/1.73   73    GFR est AA      >59 mL/min/1.73   84    BUN/Creatinine ratio      12 - 28   30 (H)    Sodium      134 - 144 mmol/L   143    Potassium      3.5 - 5.2 mmol/L   4.5    Chloride      96 - 106 mmol/L   104    CO2      20 - 29 mmol/L   25    Calcium      8.7 - 10.3 mg/dL   10.2    LDL-P      <1000 nmol/L    789   LDL-C      0 - 99 mg/dL    69   HDL-C      >39 mg/dL    89   Triglycerides      0 - 149 mg/dL    45   Cholesterol, total      100 - 199 mg/dL    167   HDL-P (Total)      >=30.5 umol/L    42.7   Small LDL-P      <=527 nmol/L    <90   LDL size      >20.5 nm    21.2   LP-IR SCORE      <=45    <25   Hemoglobin A1c, (calculated)      4.8 - 5.6 %  5.3     Estimated average glucose      mg/dL  105     Creatine Kinase,Total      24 - 173 U/L 262 (H)          Assessment/Plan:     1. Impaired glucose tolerance: Has had mildly elevated A1c levels since early 2015 when it was 5.9% and then 5.5% in 8/15, 5.9% in 4/16 and 5.5% in 8/16.   Had a 3 hour glucose tolerance tolerance test in 11/15 that showed a fasting sugar of 80, 1 hour of 121, 2 hour of 55 and 3 hour of 49. Her A1c was 5.6% at [address] when I met her in 11/16. It was unclear if she could be having intermittent spikes in her glucose that are contributing to her peripheral neuropathy so she started testing her sugars after eating and did identify spikes so has cut back to 30g of carbs or less per meal and 15-17g for snacks. Her A1c was down to 5.2% in 3/17 by POC and despite this, is still having numbness in her feet. Her A1c was 5.4% in 9/17 despite starting metformin 500 in 8/17 and continues to have symptoms in her feet. Still 5.4% in 12/17 and 3/18. Due to 5.3% in 9/18. Will try a higher dose januvia to see if this helps with her fasting and post-prandial spikes  - trial of januvia 100 mg daily  - go back to metformin  mg at night  - check A1c and cmp prior to next visit      2. Hyperlipidemia LDL goal <100: LDL 68 and  in 11/16 while taking atorva 5 mg and she cut back to 2.5 mg daily to see if this would help her CK and it didn't and her LDL apparently went higher when checked by her cardiologist so she has gone back to 5 mg daily. LDL 79 and  in 9/17. LDL still 79 in 3/18 and . LDL 69 and  in 9/18  - cont atorva 5 mg daily  - check lipid profile prior to next visit      3. Abnormal CK: this had been stable in the 200-400 range but was up to 427 in 9/17 with Dr. Walter Arcehiga but repeat down to 351 in 3/18 and 262 in 9/18  - check CK prior to next visit    4. Hypercalcemia: level was 10.3 in 3/18 but down to 9.9 on repeat. I had her cut back from 3 to 2 calcium tabs per day to be safe and level was 10.2 in 9/18  - cont citracal 2 tabs per day  - follow on metabolic panels        Patient Instructions   1) Stop the janumet XR and try januvia 100 mg one tab in the morning with breakfast and one 500 mg ER metformin in the evening.   Give me an update in 2 weeks with how this is working but you can just fill the prescription if you want to stay on this. 2) Your cholesterol is still well controlled so I will order the normal lipid panel next time. 3) Your creatinine (kidney test) is normal.    4) Your CK is a marker of muscle breakdown. Your value is the lowest it's been in 2 years. Follow-up Disposition:  Return in about 6 months (around 3/28/2019).     Copy sent to:  Dr. Beckie Borrego in [id number] via [facility]

## 2018-09-28 NOTE — MR AVS SNAPSHOT
[address] [facility] [address] [address] [phone] Patient: Javad Chavez MRN: [id number] Visit Information Date & Time Provider Department Dept. Phone Encounter #  
 9/28/2018  1:50 PM Linwood Thomson, [address] [place] [address] Diabetes and Endocrinology [id number] Follow-up Instructions Return in about 6 months (around 3/28/2019). Upcoming Health Maintenance Date Due DTaP/Tdap/Td series (1 - Tdap) 4/21/1979 PAP AKA CERVICAL CYTOLOGY 4/21/1979 Shingrix Vaccine Age 50> (1 of 2) 4/21/2008 FOBT Q 1 YEAR AGE 50-75 4/21/2008 Influenza Age 5 to Adult 8/1/2018 BREAST CANCER SCRN MAMMOGRAM 12/8/2018 Allergies as of 9/28/2018  Review Complete On: 9/28/2018 By: Linwood Thomson MD  
 No Known Allergies Current Immunizations  Reviewed on 11/29/2016 Name Date Influenza Vaccine 11/19/2017 Influenza Vaccine (Quad) PF 11/29/2016 Not reviewed this visit You Were Diagnosed With   
  
 Codes Comments Impaired glucose tolerance    -  Primary ICD-10-CM: R73.02 
ICD-9-CM: 790.22 Hyperlipidemia LDL goal <100     ICD-10-CM: E78.5 ICD-9-CM: 272.4 Elevated CK     ICD-10-CM: R74.8 ICD-9-CM: 790.5 Hypercalcemia     ICD-10-CM: C43.91 
ICD-9-CM: 275.42 Vitals BP Pulse Height(growth percentile) Weight(growth percentile) BMI OB Status 101/70 70 5' 7\" (1.702 m) 116 lb 3.2 oz (52.7 kg) 18.2 kg/m2 Menopause Smoking Status Never Smoker Vitals History BMI and BSA Data Body Mass Index Body Surface Area  
 18.2 kg/m 2 1.58 m 2 Preferred Pharmacy Pharmacy Name Phone [facility] [address]ur, [address] [phone] Your Updated Medication List  
  
   
This list is accurate as of 9/28/18  2:26 PM.  Always use your most recent med list.  
  
  
  
  
 aspirin 325 mg tablet Commonly known as:  ASPIRIN Take 325 mg by mouth daily. atorvastatin 10 mg tablet Commonly known as:  LIPITOR Take 5 mg by mouth daily. CALCIUM CITRATE + PO Take  by mouth daily. 2 tabs daily CO Q-10 100 mg capsule Generic drug:  co-enzyme Q-10 Take 100 mg by mouth daily. fish oil-dha-epa 1,200-144-216 mg Cap Take 2 Tabs by mouth daily. Glucosamine &Chondroit-MV-Min3 928-269-09-0.5 mg Tab Take  by mouth.  
  
 magnesium oxide 500 mg Tab Take 500 mg by mouth daily. metFORMIN  mg tablet Commonly known as:  GLUCOPHAGE XR Take 1 Tab by mouth daily (with dinner). multivitamin tablet Commonly known as:  ONE A DAY Take 1 Tab by mouth daily. raNITIdine 300 mg Tab Commonly known as:  ZANTAC Take 300 mg by mouth two (2) times a day. SITagliptin 100 mg tablet Commonly known as:  Ferol Chiquito Take 1 Tab by mouth daily. valACYclovir 1 gram tablet Commonly known as:  VALTREX  
500 mg daily. VITAMIN D3 2,000 unit Tab Generic drug:  cholecalciferol (vitamin D3) Take  by mouth daily. Prescriptions Printed Refills SITagliptin (JANUVIA) 100 mg tablet 11 Sig: Take 1 Tab by mouth daily. Class: Print Route: Oral  
  
We Performed the Following CK [id number] CPT(R)] HEMOGLOBIN A1C WITH EAG [64034 CPT(R)] LIPID PANEL [26954 CPT(R)] METABOLIC PANEL, COMPREHENSIVE [39353 CPT(R)] Follow-up Instructions Return in about 6 months (around 3/28/2019). Patient Instructions 1) Stop the janumet XR and try januvia 100 mg one tab in the morning with breakfast and one 500 mg ER metformin in the evening. Give me an update in 2 weeks with how this is working but you can just fill the prescription if you want to stay on this. 2) Your cholesterol is still well controlled so I will order the normal lipid panel next time. 3) Your creatinine (kidney test) is normal. 
 
4) Your CK is a marker of muscle breakdown. Your value is the lowest it's been in 2 years. Introducing [facility]! Dear Saintclair Jewel: Thank you for requesting a [employer] account. Our records indicate that you already have an active [employer] account. You can access your account anytime at [email or web address] Did you know that you can access your hospital and ER discharge instructions at any time in [employer]? You can also review all of your test results from your hospital stay or ER visit. Additional Information If you have questions, please visit the Frequently Asked Questions section of the [employer] website at [email or web address] Remember, [employer] is NOT to be used for urgent needs. For medical emergencies, dial 911. Now available from your iPhone and Android! Please provide this summary of care documentation to your next provider. If you have any questions after today's visit, please call [phone].

## 2018-09-28 NOTE — PATIENT INSTRUCTIONS
1) Stop the janumet XR and try januvia 100 mg one tab in the morning with breakfast and one 500 mg ER metformin in the evening. Give me an update in 2 weeks with how this is working but you can just fill the prescription if you want to stay on this. 2) Your cholesterol is still well controlled so I will order the normal lipid panel next time. 3) Your creatinine (kidney test) is normal.    4) Your CK is a marker of muscle breakdown. Your value is the lowest it's been in 2 years.

## 2018-10-17 ENCOUNTER — TELEPHONE (OUTPATIENT)
Dept: ENDOCRINOLOGY | Age: 58
End: 2018-10-17

## 2018-10-17 NOTE — TELEPHONE ENCOUNTER
----- Message from Renate Livingston sent at 10/16/2018  8:43 AM EDT -----  Regarding: FW:[facility] After Visit Follow-Up Message. Contact: [phone]      ----- Message -----     From: Moustapha Silver: 10/15/2018  11:21 PM       To: [facility] Nurse Pool  Subject: Sarahell Poster After Visit Follow-Up Message. Nirav Miramontes,    I filled the Januvia 100 mg prescription after taking the 14 sample pills. It seems to be helping the lunch postprandial peaks so far. Will keep you posted. I'm taking it about 4 hrs before lunch, metformin in the evening. My [employer] will indeed cover the [place] reader and monthly sensors. I would like to try it for awhile just to get more info. I can fill the prescription for the sensors but the reader prescription requires pre-authorization from your office. Could someone there please provide the info [employer] requires either by telephone or website? Phone # is [phone] or website is [employer]    Thank you! Cody Bernabe  ----- Message -----  From: Nandini Talbot MD  Sent: 10/1/2018 12:00 AM EDT  To: Mando Sherman Mary Lopes  Subject: [facility] After Visit Follow-Up Message. Jed Ms. Mary Lopes,    Thank you for your recent visit to [facility]. Your health is important to us and we are privileged to be your healthcare provider and partner. If you have follow-up questions regarding your treatment plan from todays visit, please contact us through the [employer] Patient Portal by replying to this message. In the near future, you may receive a survey about your experience with us. We hope you will take the time to let us know how we did so we can continue to improve the care you receive.       Thank you,    [facility]

## 2018-10-19 NOTE — TELEPHONE ENCOUNTER
Patient called to talk to you about the prior authorization for her [facility]. She can be reached at: [phone].

## 2018-10-19 NOTE — TELEPHONE ENCOUNTER
Please call her and let her know rather than fill out a prior authorization for the reader, I actually have one that she can  to use today or next week at her convenience.

## 2018-10-22 ENCOUNTER — TELEPHONE (OUTPATIENT)
Dept: ENDOCRINOLOGY | Age: 58
End: 2018-10-22

## 2018-10-22 NOTE — TELEPHONE ENCOUNTER
Patient is requesting an [place] for the sensors for [facility]. Patient states the quickest way to get the [place] is via phone. [phone]. Please advise     [employer]: [phone] or website [employer]. [employer]epa    Patient contact: [phone]

## 2018-10-23 NOTE — TELEPHONE ENCOUNTER
Sent her the following message through [employer]:    You had told us last week that the sensors could be filled and only the reader needed a prior authorization and this is why I provided you a sample reader. Was this not correct? Let me know when you have a chance and I can try to work on the authorization if needed but would prefer not to do this unless absolutely necessary. Thanks.

## 2019-01-24 ENCOUNTER — OFFICE VISIT (OUTPATIENT)
Dept: NEUROLOGY | Age: 59
End: 2019-01-24

## 2019-01-24 VITALS
DIASTOLIC BLOOD PRESSURE: 68 MMHG | BODY MASS INDEX: 18.83 KG/M2 | HEIGHT: 67 IN | WEIGHT: 120 LBS | HEART RATE: 70 BPM | OXYGEN SATURATION: 98 % | RESPIRATION RATE: 20 BRPM | SYSTOLIC BLOOD PRESSURE: 112 MMHG

## 2019-01-24 DIAGNOSIS — G62.9 SMALL FIBER NEUROPATHY: Primary | ICD-10-CM

## 2019-01-24 DIAGNOSIS — R74.8 ABNORMAL CK: ICD-10-CM

## 2019-01-24 NOTE — PROGRESS NOTES
Neurology Progress Note Patient ID: 
Lulu Laynekeley [id number] 
15 y.o. 
1960 HISTORY PROVIDED BY: 
Patient Chief Complaint:small fiber neuropathy Subjective:  
 Ms. Espinoza Home is here for follow up today of suspected small fiber neuropathy secondary to prediabetes. She reports her neuropathy is stable. She has no pain in her legs, feet, or back. She continues with the same numbness and inability to walk on hard surfaces. She has dysthesias especially on the left foot. She is off Lyrica at this point. We did discuss Lamictal since a sodium channel issue was suspected but since her testing was negative for this she didn't want to start any new medications. She had a hereditary panel done through [facility] that was negative. She has the diagnosis of erythromelalgia and metatarsalgia but doesn't feel this fits. It depends on gravity and pressure that will bring it on. She only really has redness when she is in the shower with hot water. She has more changes in color on the left foot. She has blanching of the foot for 10 seconds when she puts pressure on it. Her hands will get really red especially when she puts lotion on. She feels like her wrists and ankles have shrunk. She is trying to maintain her weight. She does weight lifting to be able to keep up her muscle bulk. Her HgbA1c is 5.3. She has a glucose monitor to watch her sugars. Her postprandiol peaks are well controlled. At times she feels like she will flush in the face and she will get red for hours. This is temperature dependent. She never had hot flashes but will have epiosdes of feeling warm all over. Her bladder seems to be overactive as well. She has been to [facility] for evaluation as well. Today she is also concerned regarding her short term memory. She has a FH of AD in her mom. She feels she is exhibiting some short term memory issues. Recap:  
 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 Patient is to follow at [facility]. I did speak with her neurologist there and discussed the case. Since my last visit patient did have a repeat EMG/NCS. This showed some Evidence for chronic L5 radiculopathy. Patient does have a lumbar stenosis at L4/5 but this is moderate. Does not seem to fit with her current symptoms. Patient does report that her pain again is in her metatarsals. However when she has the significant vascular changes there is no pain. Therefore she feels like other with her myalgia may not be the right terms for her. She notes that temperature and change in pressure affect her vasculature. Her refill is very slow. Her left foot is much worse in her right foot. She has a heel numbness on the left but that is new. She also has some atrophy on the foot which is new. She also notes that the foot pad has become enlarged and is causing a pressure point on that left side. She continues to wear special tissues to help with her symptoms. She takes Lyrica at low dose at night which mostly helps her with sleeping. Patient is also having some symptoms in her hands with vascular changes. Her metatarsalgia only occurs when walking on hard surfaces. Objective:  
ROS: 
Per HPI- 
Otherwise 12 point ROS was negative Meds: 
Current Outpatient Medications on File Prior to Visit Medication Sig Dispense Refill  metFORMIN ER (GLUCOPHAGE XR) 500 mg tablet Take 1 Tab by mouth daily (with dinner). 30 Tab 11  
 SITagliptin (JANUVIA) 100 mg tablet Take 1 Tab by mouth daily. 30 Tab 11  
 FREESTYLE JAMILA 10 DAY SENSOR kit Use 1 sensor every 10 days as directed 3 Kit 11  
 FREESTYLE JAMILA 10 DAY READER misc Use as directed with freestyle sensors 1 Each 0  
 CALCIUM CARB/VIT D2/MINERALS (CALCIUM CITRATE + PO) Take  by mouth daily. 2 tabs daily  valACYclovir (VALTREX) 1 gram tablet 500 mg daily.  co-enzyme Q-10 (CO Q-10) 100 mg capsule Take 100 mg by mouth daily.  Glucosamine &Chondroit-MV-Min3 154-101-40-0.5 mg tab Take  by mouth.  raNITIdine (ZANTAC) 300 mg tablet Take 300 mg by mouth two (2) times a day.  multivitamin (ONE A DAY) tablet Take 1 Tab by mouth daily.  aspirin (ASPIRIN) 325 mg tablet Take 325 mg by mouth daily.  magnesium oxide 500 mg tab Take 500 mg by mouth daily.  cholecalciferol, vitamin D3, (VITAMIN D3) 2,000 unit tab Take  by mouth daily.  fish oil-dha-epa 1,200-144-216 mg cap Take 2 Tabs by mouth daily.  atorvastatin (LIPITOR) 10 mg tablet Take 5 mg by mouth daily. No current facility-administered medications on file prior to visit. Imaging: MRI L-spine: Moderate central stenosis at L4/5 with diffuse disc bulging centrally and towards the left with nerve root compression in the region of the foramen. There is also facet arthropathy at L5-S1. Reviewed records in [facility] and [facility] tab today Lab Review Results for orders placed or performed in visit on 09/20/18 NMR LIPOPROFILE Result Value Ref Range LDL-P 789 <1,000 nmol/L  
 LDL-C 69 0 - 99 mg/dL HDL-C 89 >39 mg/dL Triglycerides 45 0 - 149 mg/dL Cholesterol, Total 167 100 - 199 mg/dL HDL-P (Total) 42.7 >=30.5 umol/L Small LDL-P <90 <=527 nmol/L  
 LDL size 21.2 >20.5 nm  
 LP-IR SCORE <25 <=45 METABOLIC PANEL, BASIC Result Value Ref Range Glucose 74 65 - 99 mg/dL BUN 26 8 - 27 mg/dL Creatinine 0.87 0.57 - 1.00 mg/dL GFR est non-AA 73 >59 mL/min/1.73 GFR est AA 84 >59 mL/min/1.73  
 BUN/Creatinine ratio 30 (H) 12 - 28 Sodium 143 134 - 144 mmol/L Potassium 4.5 3.5 - 5.2 mmol/L Chloride 104 96 - 106 mmol/L  
 CO2 25 20 - 29 mmol/L Calcium 10.2 8.7 - 10.3 mg/dL HEMOGLOBIN A1C WITH EAG Result Value Ref Range Hemoglobin A1c 5.3 4.8 - 5.6 % Estimated average glucose 105 mg/dL CK Result Value Ref Range Creatine Kinase,Total 262 (H) 24 - 173 U/L  
CVD REPORT Result Value Ref Range INTERPRETATION Note   
EMG/NCS Impression: ABNORMAL 
  
Extensive electrodiagnostic examination of the left and right lower extremities shows the followin) Intact bilateral sural sensory responses. Slightly reduced left peroneal sensory amplitude response compared to the right, which can be technical in nature. 2) Reduced left peroneal motor amplitude response compared to the right 2) Chronic, without active, motor axon loss changes in muscles innervated by left L5 root/segment 
  
These findings are suggestive of an underlying chronic, left L5 motor radiculopathy. Recommend radiological correlation. 
  
No myopathic looking motor units were noted. 
  
Electrodiagnostic examination of the right lower extremity is within normal limits. 
   
 
Exam: 
Visit Vitals Resp 20 Ht 5' 7\" (1.702 m) Wt 54.4 kg (120 lb) BMI 18.79 kg/m² Gen: Well developed CV: RRR Lungs: non labored breathing Abd: non distending Neuro: A&O x 3, no dysarthria or aphasia CN II-XII: PERRL, EOMI, face symmetric, tongue/palate midline Motor: strength 5/5 all four ext Sensory: intact to LT / decreased pinprick left heel Gait: normal  
Skin: Vascular changes in the feet with color change from white to red to purple and a blanching effect with pressure with a cap refill >10 seconds Assessment:  
Krishna Hairston is a 59 y.o. female who presents for follow up of small fiber neuropathy. Today we also discussed patient's symptoms again. Reviewed previous workup again. We discussed a [place] referral today. We will do the ANS testing to evaluate for small fiber neuropathy and based on this she will consider [place] referral. 
She is also having some issues with short term memory. She would like to pursue memory testing for this. Plan: 1. MRI of the lumbar spine as above. No referrals needed at this time. 2.  EMG/NCS to evaluate for myopathy was negative for this which did show chronic radiculopathy 3. Myasthenia gravis panel, CK, aldolase, LDH, thyroglobulin antibodies within normal limits 4. Will do ANS testing 5. Will consider [place] referral after ANS testing complete 6. Off Lyrica and will hold on Lamictal 
7. Plan [facility] [id number] [facility] Follow-up TBD based on testing Signed: 
Valentina Alarcon MD 
1/24/2019 Over 40 minutes was spent with the patient of which > 50% of the visit was spent counseling on diagnosis, management, and treatment of the diagnosis of small fiber neuropathy and vasculopathy Medications and side effects discussed with patient in detail. With any new medications prescribed, patient was given instructions on administration and side effects. Written medication information was provided to the patient as well. This note was created using voice recognition software. Despite editing, there may be syntax errors. This note will not be viewable in [address]

## 2019-01-24 NOTE — PROGRESS NOTES
Chief Complaint Patient presents with  
 Other Small Fiber Neuropathy 1. Have you been to the ER, urgent care clinic since your last visit? Hospitalized since your last visit? No 
 
2. Have you seen or consulted any other health care providers outside of the [address] [place] [address] since your last visit? Include any pap smears or colon screening. No  
 
Visit Vitals /68 Pulse 70 Resp 20 Ht 5' 7\" (1.702 m) Wt 54.4 kg (120 lb) SpO2 98% BMI 18.79 kg/m²

## 2019-01-24 NOTE — PATIENT INSTRUCTIONS
Patient Instruction Plan/ Result Policy If we have ordered testing for you, know that; \"NO NEWS IS GOOD NEWS! \" It is our policy that we know longer call patients with results, nor do we  give test results over the phone. We schedule follow up appointments so that your results can be discussed in person. This allows you to address any questions you have regarding the results. If something of concern is revealed on your test, we will contact you to discuss the matter and if needed schedule a sooner follow up appointment. Additionally, results may be found by using the My Chart feature and one of our patient service representatives at the  can give you instructions on how to access this feature to utilize our electronic medical record system. Thank you for your understanding. PRESCRIPTION REFILL POLICY [facility] Statement to Patients April 1, 2014 In an effort to ensure the large volume of patient prescription refills is processed in the most efficient and expeditious manner, we are asking our patients to assist us by calling your Pharmacy for all prescription refills, this will include also your  Mail Order Pharmacy. The pharmacy will contact our office electronically to continue the refill process. Please do not wait until the last minute to call your pharmacy. We need at least 48 hours (2days) to fill prescriptions. We also encourage you to call your pharmacy before going to  your prescription to make sure it is ready. With regard to controlled substance prescription refill requests (narcotic refills) that need to be picked up at our office, we ask your cooperation by providing us with at least 72 hours (3days) notice that you will need a refill. We will not refill narcotic prescription refill requests after 4:00pm on any weekday, Monday through Thursday, or after 2:00pm on Fridays, or on the weekends. We encourage everyone to explore another way of getting your prescription refill request processed using [employer], our patient web portal through our electronic medical record system. [employer] is an efficient and effective way to communicate your medication request directly to the office and  downloadable as an veda on your smart phone . [employer] also features a review functionality that allows you to view your medication list as well as leave messages for your physician. Are you ready to get connected? If so please review the attatched instructions or speak to any of our staff to get you set up right away! Thank you so much for your cooperation. Should you have any questions please contact our Practice Administrator. The Physicians and Staff,  [facility] Patient Instructions/Plans: 
· Please follow up with Dr. Maia [facility]vaez at [facility]

## 2019-01-25 ENCOUNTER — TELEPHONE (OUTPATIENT)
Dept: NEUROLOGY | Age: 59
End: 2019-01-25

## 2019-02-05 NOTE — TELEPHONE ENCOUNTER
Called and spoke to patient scheduled for ans testing on  Wednesday, February 13, 2019 01:00 PM  Instructions was given to how to prepare for testing   Hold all oral medications for 48 hrs prior to testing patient would like nurse to ask Dr Bharati Mg if  she can take her zantac, metformin and [place] and [place] ?  She is mostly worried about zantac   Please advise

## 2019-02-11 ENCOUNTER — TELEPHONE (OUTPATIENT)
Dept: NEUROLOGY | Age: 59
End: 2019-02-11

## 2019-02-11 NOTE — TELEPHONE ENCOUNTER
----- Message from [facility] sent at 2/11/2019 11:41 AM EST -----  Regarding: Dr. Radha Simons: [phone]  Pt wanted to know if she had to r/s her ANS what is the next appt?

## 2019-02-18 ENCOUNTER — OFFICE VISIT (OUTPATIENT)
Dept: NEUROLOGY | Age: 59
End: 2019-02-18

## 2019-02-18 VITALS — WEIGHT: 120 LBS | HEIGHT: 67 IN | BODY MASS INDEX: 18.83 KG/M2

## 2019-02-18 DIAGNOSIS — G62.9 PERIPHERAL NERVE DISORDER: Primary | ICD-10-CM

## 2019-02-18 NOTE — PATIENT INSTRUCTIONS
Advance Directives: Care Instructions  Your Care Instructions  An advance directive is a legal way to state your wishes at the end of your life. It tells your family and your doctor what to do if you can no longer say what you want. There are two main types of advance directives. You can change them any time that your wishes change. · A living will tells your family and your doctor your wishes about life support and other treatment. · A durable power of  for health care lets you name a person to make treatment decisions for you when you can't speak for yourself. This person is called a health care agent. If you do not have an advance directive, decisions about your medical care may be made by a doctor or a  who doesn't know you. It may help to think of an advance directive as a gift to the people who care for you. If you have one, they won't have to make tough decisions by themselves. Follow-up care is a key part of your treatment and safety. Be sure to make and go to all appointments, and call your doctor if you are having problems. It's also a good idea to know your test results and keep a list of the medicines you take. How can you care for yourself at home? · Discuss your wishes with your loved ones and your doctor. This way, there are no surprises. · Many states have a unique form. Or you might use a universal form that has been approved by many states. This kind of form can sometimes be completed and stored online. Your electronic copy will then be available wherever you have a connection to the Internet. In most cases, doctors will respect your wishes even if you have a form from a different state. · You don't need a  to do an advance directive. But you may want to get legal advice. · Think about these questions when you prepare an advance directive:  ? Who do you want to make decisions about your medical care if you are not able to?  Many people choose a family member or close friend. ? Do you know enough about life support methods that might be used? If not, talk to your doctor so you understand. ? What are you most afraid of that might happen? You might be afraid of having pain, losing your independence, or being kept alive by machines. ? Where would you prefer to die? Choices include your home, a hospital, or a nursing home. ? Would you like to have information about hospice care to support you and your family? ? Do you want to donate organs when you die? ? Do you want certain [employer] practices performed before you die? If so, put your wishes in the advance directive. · Read your advance directive every year, and make changes as needed. When should you call for help? Be sure to contact your doctor if you have any questions. Where can you learn more? Go to http://derejeeOn Communicationsyamilka.info/. Enter R264 in the search box to learn more about \"Advance Directives: Care Instructions. \"  Current as of: April 18, 2018  Content Version: 11.9  © 8305-6648 [employer]. Care instructions adapted under license by [employer] (which disclaims liability or warranty for this information). If you have questions about a medical condition or this instruction, always ask your healthcare professional. [place] [address] any warranty or liability for your use of this information. Learning About Vikash [employer]  What is a living will? A living will is a legal form you use to write down the kind of care you want at the end of your life. It is used by the health professionals who will treat you if you aren't able to decide for yourself. If you put your wishes in writing, your loved ones and others will know what kind of care you want. They won't need to guess. This can ease your mind and be helpful to others. A living will is not the same as an estate or property will.  An estate will explains what you want to happen with your money and property after you die. Is a living will a legal document? A living will is a legal document. Each state has its own laws about living bains. If you move to another state, make sure that your living will is legal in the state where you now live. Or you might use a universal form that has been approved by many states. This kind of form can sometimes be completed and stored online. Your electronic copy will then be available wherever you have a connection to the Internet. In most cases, doctors will respect your wishes even if you have a form from a different state. · You don't need an  to complete a living will. But legal advice can be helpful if your state's laws are unclear, your health history is complicated, or your family can't agree on what should be in your living will. · You can change your living will at any time. Some people find that their wishes about end-of-life care change as their health changes. · In addition to making a living will, think about completing a medical power of  form. This form lets you name the person you want to make end-of-life treatment decisions for you (your \"health care agent\") if you're not able to. Many hospitals and nursing homes will give you the forms you need to complete a living will and a medical power of . · Your living will is used only if you can't make or communicate decisions for yourself anymore. If you become able to make decisions again, you can accept or refuse any treatment, no matter what you wrote in your living will. · Your state may offer an online registry. This is a place where you can store your living will online so the doctors and nurses who need to treat you can find it right away. What should you think about when creating a living will? Talk about your end-of-life wishes with your family members and your doctor. Let them know what you want.  That way the people making decisions for you won't be surprised by your choices. Think about these questions as you make your living will:  · Do you know enough about life support methods that might be used? If not, talk to your doctor so you know what might be done if you can't breathe on your own, your heart stops, or you're unable to swallow. · What things would you still want to be able to do after you receive life-support methods? Would you want to be able to walk? To speak? To eat on your own? To live without the help of machines? · If you have a choice, where do you want to be cared for? In your home? At a hospital or nursing home? · Do you want certain Christianity practices performed if you become very ill? · If you have a choice at the end of your life, where would you prefer to die? At home? In a hospital or nursing home? Somewhere else? · Would you prefer to be buried or cremated? · Do you want your organs to be donated after you die? What should you do with your living will? · Make sure that your family members and your health care agent have copies of your living will. · Give your doctor a copy of your living will to keep in your medical record. If you have more than one doctor, make sure that each one has a copy. · You may want to put a copy of your living will where it can be easily found. Where can you learn more? Go to http://dereje-yamilka.info/. Enter X313 in the search box to learn more about \"Learning About Living Perroluiza. \"  Current as of: April 18, 2018  Content Version: 11.9  © 2833-2013 [employer]. Care instructions adapted under license by [employer] (which disclaims liability or warranty for this information). If you have questions about a medical condition or this instruction, always ask your healthcare professional. [address] any warranty or liability for your use of this information.

## 2019-02-19 NOTE — PROCEDURES
[facility] [place] [facility]  [facility] [address], [address] [place], [address] [facility]  Phone: [phone]  FAX: [phone]     Clinical Autonomic Testing Report     Patient ID:  Lulu Colunga  [id number]  59 y.o.  1960     REFERRED BY: Khushboo Damon MD  PCP: No primary care provider on file. Date of Testin2019     Indication/History: For the past 3 yrs, patient has numbness in her feet and inability to walk on hard surfaces. (+) Prediabetes (+) flushing    Patient is coming for syncope/autonomic dysfunction evaluation. Medications taken 48 hrs before the test: Atorvastatin, Januvia, Metformin, Ranitidine,      Procedure: This Autonomic Nervous System (ANS) testing is performed by utilizing [address] [place] [address] [employer] [facility] System, with established protocol. Multiple procedures performed: Heart rate response to deep breathing (HRDB), Valsalva ratio, Heart rate and BP response to head up tilt (HUT), and Quantitative sudomotor axon reflex testing (QSWEAT) . Result:  1. Heart response to deep  breathing (HRDB): 2 series of 6 cycles were performed and the mean of 6 consecutive cycles was obtained. Average HR difference was 9.3 and E:I ratio was 1.13. Normal Response. 2. Heart rate response to Valsalva maneuver:  [id number] BP to Valsalva was measured and BP response in all 4 phases was normal. Heart response was the opposite of BP, a normal response. ( VR = 2.37 )  3. HUT (head-up tilt) : [id number]-[id number] BP and HR were measured, up to 15 minutes post tilt. No significant BP reduction or HR acceleration/deceleration. 4. SUDOMOTOR: QSWEAT response showed absent sweat production in the foot       Impression:   ABNORMAL    Absent sweat production in the foot which can be seen in small-fiber neuropathy. Cardiovascular autonomic testing portion is within normal limits with no evidence of orthostatic hypotension.          Stephany Cabot, MD  Diplomate, American Board of Psychiatry and Neurology  Diplomate, Neuromuscular Medicine  Diplomate, American Board of Electrodiagnostic Medicine    Note: Raw Data will be scanned separately in Media

## 2019-03-20 LAB
ALBUMIN SERPL-MCNC: 4.3 G/DL (ref 3.6–4.8)
ALBUMIN/GLOB SERPL: 1.7 {RATIO} (ref 1.2–2.2)
ALP SERPL-CCNC: 71 IU/L (ref 39–117)
ALT SERPL-CCNC: 21 IU/L (ref 0–32)
AST SERPL-CCNC: 26 IU/L (ref 0–40)
BILIRUB SERPL-MCNC: 0.3 MG/DL (ref 0–1.2)
BUN SERPL-MCNC: 24 MG/DL (ref 8–27)
BUN/CREAT SERPL: 30 (ref 12–28)
CALCIUM SERPL-MCNC: 9.6 MG/DL (ref 8.7–10.3)
CHLORIDE SERPL-SCNC: 105 MMOL/L (ref 96–106)
CHOLEST SERPL-MCNC: 162 MG/DL (ref 100–199)
CK SERPL-CCNC: 257 U/L (ref 24–173)
CO2 SERPL-SCNC: 26 MMOL/L (ref 20–29)
CREAT SERPL-MCNC: 0.79 MG/DL (ref 0.57–1)
EST. AVERAGE GLUCOSE BLD GHB EST-MCNC: 111 MG/DL
GLOBULIN SER CALC-MCNC: 2.5 G/DL (ref 1.5–4.5)
GLUCOSE SERPL-MCNC: 80 MG/DL (ref 65–99)
HBA1C MFR BLD: 5.5 % (ref 4.8–5.6)
HDLC SERPL-MCNC: 82 MG/DL
INTERPRETATION, 910389: NORMAL
LDLC SERPL CALC-MCNC: 71 MG/DL (ref 0–99)
POTASSIUM SERPL-SCNC: 4.9 MMOL/L (ref 3.5–5.2)
PROT SERPL-MCNC: 6.8 G/DL (ref 6–8.5)
SODIUM SERPL-SCNC: 144 MMOL/L (ref 134–144)
TRIGL SERPL-MCNC: 46 MG/DL (ref 0–149)
VLDLC SERPL CALC-MCNC: 9 MG/DL (ref 5–40)

## 2019-03-29 ENCOUNTER — OFFICE VISIT (OUTPATIENT)
Dept: ENDOCRINOLOGY | Age: 59
End: 2019-03-29

## 2019-03-29 VITALS
HEART RATE: 79 BPM | SYSTOLIC BLOOD PRESSURE: 95 MMHG | DIASTOLIC BLOOD PRESSURE: 59 MMHG | HEIGHT: 67 IN | BODY MASS INDEX: 18.87 KG/M2 | WEIGHT: 120.2 LBS

## 2019-03-29 DIAGNOSIS — E83.52 HYPERCALCEMIA: ICD-10-CM

## 2019-03-29 DIAGNOSIS — R74.8 ELEVATED CK: ICD-10-CM

## 2019-03-29 DIAGNOSIS — R73.02 IMPAIRED GLUCOSE TOLERANCE: Primary | ICD-10-CM

## 2019-03-29 DIAGNOSIS — E78.5 HYPERLIPIDEMIA LDL GOAL <100: ICD-10-CM

## 2019-03-29 RX ORDER — GUAIFENESIN 100 MG/5ML
81 LIQUID (ML) ORAL DAILY
COMMUNITY

## 2019-03-29 NOTE — PATIENT INSTRUCTIONS
1) I will plan on seeing you back in one year but if you feel you need to have your labs checked sooner, please let me know. 2) Stay on the current regimen for now but if you want to change it let me know.

## 2019-03-29 NOTE — PROGRESS NOTES
Chief Complaint   Patient presents with    Diabetes     no pcp and pharmacy confirmed     History of Present Illness: Johnnie Romero is a 59 y.o. female here for follow up of diabetes. Weight up 4 lbs since last visit in 9/18. Has been taking the higher dose of januvia 100 mg daily and still taking 1 tab of metformin daily. Initially found that this was helping with her post-meal spikes but the longer she took it, she's not so sure. Did end up paying for the freestyle kari after her insurance didn't pay for this and finds that sometimes this is accurate and sometimes not as at times it says it's lower than she really is. Majority of her readings are staying between  during the day. Willing to be seen annually at this point. Current Outpatient Medications   Medication Sig    aspirin 81 mg chewable tablet Take 81 mg by mouth daily.  metFORMIN ER (GLUCOPHAGE XR) 500 mg tablet Take 1 Tab by mouth daily (with dinner).  SITagliptin (JANUVIA) 100 mg tablet Take 1 Tab by mouth daily.  CALCIUM CARB/VIT D2/MINERALS (CALCIUM CITRATE + PO) Take  by mouth daily. 2 tabs daily    valACYclovir (VALTREX) 1 gram tablet 500 mg daily.  co-enzyme Q-10 (CO Q-10) 100 mg capsule Take 100 mg by mouth daily.  Glucosamine &Chondroit-MV-Min3 509-207-06-0.5 mg tab Take  by mouth.  raNITIdine (ZANTAC) 300 mg tablet Take 300 mg by mouth two (2) times a day.  multivitamin (ONE A DAY) tablet Take 1 Tab by mouth daily.  magnesium oxide 500 mg tab Take 500 mg by mouth daily.  cholecalciferol, vitamin D3, (VITAMIN D3) 2,000 unit tab Take  by mouth daily.  fish oil-dha-epa 1,200-144-216 mg cap Take 2 Tabs by mouth daily.  atorvastatin (LIPITOR) 10 mg tablet Take 5 mg by mouth daily. No current facility-administered medications for this visit.       No Known Allergies     Review of Systems:  - Eyes: no blurry vision or double vision  - Cardiovascular: no chest pain  - Respiratory: no shortness of breath  - Musculoskeletal: no myalgias  - Neurological: no numbness/tingling in extremities    Physical Examination:  Blood pressure 95/59, pulse 79, height 5' 7\" (1.702 m), weight 120 lb 3.2 oz (54.5 kg). - General: pleasant, no distress, good eye contact   - Neck: no carotid bruits  - Cardiovascular: regular, normal rate, nl s1 and s2, no m/r/g,   - Respiratory: clear bilaterally  - Integumentary: no edema,   - Psychiatric: normal mood and affect    Data Reviewed:   Component      Latest Ref Rng & Units 3/19/2019 3/19/2019 3/19/2019 3/19/2019          11:12 AM 11:12 AM 11:12 AM 11:12 AM   Glucose      65 - 99 mg/dL 80      BUN      8 - 27 mg/dL 24      Creatinine      0.57 - 1.00 mg/dL 0.79      GFR est non-AA      >59 mL/min/1.73 82      GFR est AA      >59 mL/min/1.73 94      BUN/Creatinine ratio      12 - 28 30 (H)      Sodium      134 - 144 mmol/L 144      Potassium      3.5 - 5.2 mmol/L 4.9      Chloride      96 - 106 mmol/L 105      CO2      20 - 29 mmol/L 26      Calcium      8.7 - 10.3 mg/dL 9.6      Protein, total      6.0 - 8.5 g/dL 6.8      Albumin      3.6 - 4.8 g/dL 4.3      GLOBULIN, TOTAL      1.5 - 4.5 g/dL 2.5      A-G Ratio      1.2 - 2.2 1.7      Bilirubin, total      0.0 - 1.2 mg/dL 0.3      Alk. phosphatase      39 - 117 IU/L 71      AST      0 - 40 IU/L 26      ALT (SGPT)      0 - 32 IU/L 21      Cholesterol, total      100 - 199 mg/dL   162    Triglyceride      0 - 149 mg/dL   46    HDL Cholesterol      >39 mg/dL   82    VLDL, calculated      5 - 40 mg/dL   9    LDL, calculated      0 - 99 mg/dL   71    Hemoglobin A1c, (calculated)      4.8 - 5.6 %    5.5   Estimated average glucose      mg/dL    111   Creatine Kinase,Total      24 - 173 U/L  257 (H)         Assessment/Plan:     1. Impaired glucose tolerance: Has had mildly elevated A1c levels since early 2015 when it was 5.9% and then 5.5% in 8/15, 5.9% in 4/16 and 5.5% in 8/16.   Had a 3 hour glucose tolerance tolerance test in 11/15 that showed a fasting sugar of 80, 1 hour of 121, 2 hour of 55 and 3 hour of 49. Her A1c was 5.6% at [address] when I met her in 11/16. It was unclear if she could be having intermittent spikes in her glucose that are contributing to her peripheral neuropathy so she started testing her sugars after eating and did identify spikes so has cut back to 30g of carbs or less per meal and 15-17g for snacks. Her A1c was down to 5.2% in 3/17 by POC and despite this, is still having numbness in her feet. Her A1c was 5.4% in 9/17 despite starting metformin 500 in 8/17 and continues to have symptoms in her feet. Still 5.4% in 12/17 and 3/18. Down to 5.3% in 9/18. Up to 5.5% in 3/19 but overall is stable so won't make any changes. - cont januvia 100 mg daily  - cont metformin  mg at night  - check A1c in 6 months  - check A1c and cmp prior to next visit      2. Hyperlipidemia LDL goal <100: LDL 68 and  in 11/16 while taking atorva 5 mg and she cut back to 2.5 mg daily to see if this would help her CK and it didn't and her LDL apparently went higher when checked by her cardiologist so she has gone back to 5 mg daily. LDL 79 and  in 9/17. LDL still 79 in 3/18 and . LDL 69 and  in 9/18. LDL 71 and  in 3/19  - cont atorva 5 mg daily  - check lipid profile prior to next visit      3. Abnormal CK: this had been stable in the 200-400 range but was up to 427 in 9/17 with Dr. Veronica Dumont but repeat down to 351 in 3/18 and 262 in 9/18  - check CK prior to next visit    4. Hypercalcemia: level was 10.3 in 3/18 but down to 9.9 on repeat. I had her cut back from 3 to 2 calcium tabs per day to be safe and level was 10.2 in 9/18 and 9.6 in 3/19  - cont citracal 2 tabs per day  - follow on metabolic panels      Patient Instructions   1) I will plan on seeing you back in one year but if you feel you need to have your labs checked sooner, please let me know.     2) Stay on the current regimen for now but if you want to change it let me know. Follow-up and Dispositions    · Return in about 1 year (around 3/29/2020). Copy sent to:  Dr. Eladio Humphreys in [address] via [facility]    Lab follow up: 10/3/19  Component      Latest Ref Rng & Units 10/1/2019           9:56 AM   Hemoglobin A1c, (calculated)      4.8 - 5.6 % 5.5   Estimated average glucose      mg/dL 111     Sent her the following message through [employer]:  Hemoglobin A1c is a 3 month marker of your diabetes control. Yours is normal at 5.5% and stable from last check. Continue to work on your diet and exercise and take all your medications as directed.

## 2019-05-21 RX ORDER — METFORMIN HYDROCHLORIDE 500 MG/1
TABLET, EXTENDED RELEASE ORAL
Qty: 90 TAB | Refills: 3 | Status: SHIPPED | OUTPATIENT
Start: 2019-05-21 | End: 2020-07-01 | Stop reason: SDUPTHER

## 2019-09-28 RX ORDER — SITAGLIPTIN 100 MG/1
TABLET, FILM COATED ORAL
Qty: 30 TAB | Refills: 11 | Status: SHIPPED | OUTPATIENT
Start: 2019-09-28 | End: 2020-10-14 | Stop reason: SDUPTHER

## 2019-10-02 LAB
EST. AVERAGE GLUCOSE BLD GHB EST-MCNC: 111 MG/DL
HBA1C MFR BLD: 5.5 % (ref 4.8–5.6)

## 2020-03-10 DIAGNOSIS — R73.02 IMPAIRED GLUCOSE TOLERANCE: ICD-10-CM

## 2020-03-10 DIAGNOSIS — R74.8 ELEVATED CK: ICD-10-CM

## 2020-03-10 DIAGNOSIS — E78.5 HYPERLIPIDEMIA LDL GOAL <100: ICD-10-CM

## 2020-03-10 DIAGNOSIS — E83.52 HYPERCALCEMIA: ICD-10-CM

## 2020-03-21 LAB
ALBUMIN SERPL-MCNC: 4.6 G/DL (ref 3.8–4.8)
ALBUMIN/GLOB SERPL: 1.9 {RATIO} (ref 1.2–2.2)
ALP SERPL-CCNC: 74 IU/L (ref 39–117)
ALT SERPL-CCNC: 22 IU/L (ref 0–32)
AST SERPL-CCNC: 23 IU/L (ref 0–40)
BILIRUB SERPL-MCNC: 0.4 MG/DL (ref 0–1.2)
BUN SERPL-MCNC: 23 MG/DL (ref 8–27)
BUN/CREAT SERPL: 28 (ref 12–28)
CALCIUM SERPL-MCNC: 10 MG/DL (ref 8.7–10.3)
CHLORIDE SERPL-SCNC: 102 MMOL/L (ref 96–106)
CHOLEST SERPL-MCNC: 176 MG/DL (ref 100–199)
CK SERPL-CCNC: 222 U/L (ref 24–173)
CO2 SERPL-SCNC: 22 MMOL/L (ref 20–29)
CREAT SERPL-MCNC: 0.81 MG/DL (ref 0.57–1)
EST. AVERAGE GLUCOSE BLD GHB EST-MCNC: 111 MG/DL
GLOBULIN SER CALC-MCNC: 2.4 G/DL (ref 1.5–4.5)
GLUCOSE SERPL-MCNC: 92 MG/DL (ref 65–99)
HBA1C MFR BLD: 5.5 % (ref 4.8–5.6)
HDLC SERPL-MCNC: 87 MG/DL
INTERPRETATION, 910389: NORMAL
LDLC SERPL CALC-MCNC: 78 MG/DL (ref 0–99)
POTASSIUM SERPL-SCNC: 4.3 MMOL/L (ref 3.5–5.2)
PROT SERPL-MCNC: 7 G/DL (ref 6–8.5)
SODIUM SERPL-SCNC: 140 MMOL/L (ref 134–144)
TRIGL SERPL-MCNC: 54 MG/DL (ref 0–149)
VLDLC SERPL CALC-MCNC: 11 MG/DL (ref 5–40)

## 2020-03-27 ENCOUNTER — VIRTUAL VISIT (OUTPATIENT)
Dept: ENDOCRINOLOGY | Age: 60
End: 2020-03-27

## 2020-03-27 DIAGNOSIS — R73.02 IMPAIRED GLUCOSE TOLERANCE: Primary | ICD-10-CM

## 2020-03-27 DIAGNOSIS — E83.52 HYPERCALCEMIA: ICD-10-CM

## 2020-03-27 DIAGNOSIS — R74.8 ELEVATED CK: ICD-10-CM

## 2020-03-27 DIAGNOSIS — E78.5 HYPERLIPIDEMIA LDL GOAL <100: ICD-10-CM

## 2020-03-27 RX ORDER — FLASH GLUCOSE SENSOR
KIT MISCELLANEOUS
Qty: 2 KIT | Refills: 11 | Status: SHIPPED | OUTPATIENT
Start: 2020-03-27 | End: 2021-04-15 | Stop reason: SDUPTHER

## 2020-03-27 RX ORDER — FAMOTIDINE 20 MG/1
20 TABLET, FILM COATED ORAL DAILY
COMMUNITY
End: 2021-04-01

## 2020-03-27 NOTE — PROGRESS NOTES
Chief Complaint   Patient presents with    Diabetes     no pcp and pharmacy confirmed    Other     MYCHART       **THIS IS A VIRTUAL VISIT VIA A VIDEO SYNCHRONOUS DISCUSSION. PATIENT AGREED TO HAVE THEIR CARE DELIVERED OVER A [employer]. ME VIDEO VISIT IN PLACE OF THEIR REGULARLY SCHEDULED OFFICE VISIT**    History of Present Illness: Tomeka Nunez is a 62 y.o. female here for follow up of diabetes. No major change to health since last visit aside from one time in the fall when she was having more PVCs and ended up having EMS called to her house and had an EKG that confirmed these were PVCs and did not go to the ER. Still taking one [place] and [place] and one metformin per day. Has been using the freestyle kari intermittently and this confirms the same information as she has had before with some post-meal spikes and the majority of her readings are in the 70s to the 130s. Still taking lipitor 10 mg 1/2 tab daily. No chest pain or palpitations and no myalgias even off the co-q-10. Has checked some BP readings at home and has seen some systolic close to 284 and some diastolic close to 80. Weight is stable at 120 lbs. She is in the process of moving to [place] sometime this spring but would still like to be seen annually if possible. Current Outpatient Medications   Medication Sig    famotidine (PEPCID) 20 mg tablet Take 20 mg by mouth daily.  JANUVIA 100 mg tablet TAKE ONE TABLET BY MOUTH ONE TIME DAILY     metFORMIN ER (GLUCOPHAGE XR) 500 mg tablet TAKE ONE TABLEY BY MOUTH DAILY (WITH DINNER)    aspirin 81 mg chewable tablet Take 81 mg by mouth daily.  CALCIUM CARB/VIT D2/MINERALS (CALCIUM CITRATE + PO) Take  by mouth daily. 2 tabs daily    valACYclovir (VALTREX) 1 gram tablet 500 mg daily.  Glucosamine &Chondroit-MV-Min3 526-565-91-0.5 mg tab Take  by mouth daily.  multivitamin (ONE A DAY) tablet Take 1 Tab by mouth daily.  magnesium oxide 500 mg tab Take 500 mg by mouth daily.     cholecalciferol, vitamin D3, (VITAMIN D3) 2,000 unit tab Take  by mouth daily.  atorvastatin (LIPITOR) 10 mg tablet Take 5 mg by mouth daily. No current facility-administered medications for this visit. No Known Allergies     Review of Systems: PER HPI    Physical Examination:  - GENERAL: NCAT, Appears well nourished   - EYES: EOMI, non-icteric, no proptosis   - Ear/Nose/Throat: NCAT, no visible inflammation or masses   - CARDIOVASCULAR: no cyanosis, no visible JVD   - RESPIRATORY: respiratory effort normal without any distress or labored breathing   - MUSCULOSKELETAL: Normal ROM of neck and upper extremities observed   - SKIN: No rash on face  - NEUROLOGIC:  No facial asymmetry (Cranial nerve 7 motor function), No gaze palsy   - PSYCHIATRIC: Normal affect, Normal insight and judgement       Data Reviewed:   Component      Latest Ref Rng & Units 3/20/2020 3/20/2020 3/20/2020 3/20/2020           9:46 AM  9:46 AM  9:46 AM  9:46 AM   Glucose      65 - 99 mg/dL  92     BUN      8 - 27 mg/dL  23     Creatinine      0.57 - 1.00 mg/dL  0.81     GFR est non-AA      >59 mL/min/1.73  79     GFR est AA      >59 mL/min/1.73  91     BUN/Creatinine ratio      12 - 28  28     Sodium      134 - 144 mmol/L  140     Potassium      3.5 - 5.2 mmol/L  4.3     Chloride      96 - 106 mmol/L  102     CO2      20 - 29 mmol/L  22     Calcium      8.7 - 10.3 mg/dL  10.0     Protein, total      6.0 - 8.5 g/dL  7.0     Albumin      3.8 - 4.8 g/dL  4.6     GLOBULIN, TOTAL      1.5 - 4.5 g/dL  2.4     A-G Ratio      1.2 - 2.2  1.9     Bilirubin, total      0.0 - 1.2 mg/dL  0.4     Alk.  phosphatase      39 - 117 IU/L  74     AST      0 - 40 IU/L  23     ALT (SGPT)      0 - 32 IU/L  22     Cholesterol, total      100 - 199 mg/dL   176    Triglyceride      0 - 149 mg/dL   54    HDL Cholesterol      >39 mg/dL   87    VLDL, calculated      5 - 40 mg/dL   11    LDL, calculated      0 - 99 mg/dL   78    Hemoglobin A1c, (calculated)      4.8 - 5.6 %    5.5   Estimated average glucose      mg/dL    111   Creatine Kinase,Total      24 - 173 U/L 222 (H)          Assessment/Plan:     1. Impaired glucose tolerance: Has had mildly elevated A1c levels since early 2015 when it was 5.9% and then 5.5% in 8/15, 5.9% in 4/16 and 5.5% in 8/16. Had a 3 hour glucose tolerance tolerance test in 11/15 that showed a fasting sugar of 80, 1 hour of 121, 2 hour of 55 and 3 hour of 49. Her A1c was 5.6% at [facility] when I met her in 11/16. It was unclear if she could be having intermittent spikes in her glucose that are contributing to her peripheral neuropathy so she started testing her sugars after eating and did identify spikes so has cut back to 30g of carbs or less per meal and 15-17g for snacks. Her A1c was down to 5.2% in 3/17 by POC and despite this, is still having numbness in her feet. Her A1c was 5.4% in 9/17 despite starting metformin 500 in 8/17 and continues to have symptoms in her feet. Still 5.4% in 12/17 and 3/18. Down to 5.3% in 9/18. Up to 5.5% in 3/19 and stable in 10/19 and 3/20 so won't make any changes. - cont januvia 100 mg daily  - cont metformin  mg at night  - check A1c in 6 months  - check A1c and cmp prior to next visit      2. Hyperlipidemia LDL goal <100: LDL 68 and  in 11/16 while taking atorva 5 mg and she cut back to 2.5 mg daily to see if this would help her CK and it didn't and her LDL apparently went higher when checked by her cardiologist so she has gone back to 5 mg daily. LDL 79 and  in 9/17. LDL still 79 in 3/18 and . LDL 69 and  in 9/18. LDL 71 and  in 3/19. LDL 78 and  in 3/20  - cont atorva 5 mg daily  - check lipid profile prior to next visit      3. Abnormal CK: this had been stable in the 200-400 range but was up to 427 in 9/17 with Dr. Naveed Steinberg but repeat down to 351 in 3/18 and rest as listed in lipid profile above  - check CK prior to next visit    4.   Hypercalcemia: level was 10.3 in 3/18 but down to 9.9 on repeat. I had her cut back from 3 to 2 calcium tabs per day to be safe and level was 10.2 in 9/18 and 9.6 in 3/19 and 10.0 in 3/20  - cont citracal 2 tabs per day  - follow on metabolic panels    Patient Instructions   1) Your Hemoglobin A1c (3 month test of blood sugar) remains normal at 5.5%. 2) Your cholesterol was slightly higher but still at goal.    3) Your liver and kidney are normal.    4) Your electrolytes (sodium, potassium, and calcium) are all normal.  Your glucose (blood sugar) is normal.    5) Your CK is a marker of muscle breakdown. Your value has improved from last year. 6) I will mail you an undated lab slip to check an A1c sometime in the fall and another one for the full panel to be used sometime in the spring of 2021. 7) Let me know if you plan on coming back to [place] in the spring of 2021 and I'm happy to squeeze you in and I can also check to see if we can do a virtual visit over [employer] next year. Follow-up and Dispositions    · Return for sometime in the spring of 2021. Copy sent to:  Dr. Juliette Medel in [place], [place]    Lab follow up: 11/7/20  Component      Latest Ref Rng & Units 11/4/2020          11:07 AM   Hemoglobin A1c, (calculated)      4.8 - 5.6 % 5.5   Estimated average glucose      mg/dL 111     Sent her the following message through [employer]:  Your Hemoglobin A1c (3 month test of blood sugar) remains normal at 5.5% and has not changed since March 2019 so your current regimen is working well.

## 2020-03-27 NOTE — LETTER
4/10/2020 6:10 PM 
 
Ms. Bridgett Echevarria [address] [place] [id number] Since you haven't read the message I sent you on 3/27/20 through [address], I wanted to send you a letter with the message: 
 
1) Your Hemoglobin A1c (3 month test of blood sugar) remains normal at 5.5%. 2) Your cholesterol was slightly higher but still at goal.  
 
3) Your liver and kidney are normal.  
 
4) Your electrolytes (sodium, potassium, and calcium) are all normal.  Your glucose (blood sugar) is normal.  
 
5) Your CK is a marker of muscle breakdown.  Your value has improved from last year. 6) I will mail you an undated lab slip to check an A1c sometime in the fall and another one for the full panel to be used sometime in the spring of 2021. 7) Let me know if you plan on coming back to [place] in the spring of 2021 and I'm happy to squeeze you in and I can also check to see if we can do a virtual visit over [facility] next year.   
 
 
 
 
Sincerely, 
 
 
Soheila Cummings MD

## 2020-03-27 NOTE — PATIENT INSTRUCTIONS
1) Your Hemoglobin A1c (3 month test of blood sugar) remains normal at 5.5%. 2) Your cholesterol was slightly higher but still at goal. 
 
3) Your liver and kidney are normal. 
 
4) Your electrolytes (sodium, potassium, and calcium) are all normal.  Your glucose (blood sugar) is normal. 
 
5) Your CK is a marker of muscle breakdown. Your value has improved from last year. 6) I will mail you an undated lab slip to check an A1c sometime in the fall and another one for the full panel to be used sometime in the spring of 2021. 7) Let me know if you plan on coming back to [place] in the spring of 2021 and I'm happy to squeeze you in and I can also check to see if we can do a virtual visit over [facility] next year.

## 2020-07-06 RX ORDER — METFORMIN HYDROCHLORIDE 500 MG/1
TABLET, EXTENDED RELEASE ORAL
Qty: 90 TAB | Refills: 3 | Status: SHIPPED | OUTPATIENT
Start: 2020-07-06

## 2020-10-14 NOTE — TELEPHONE ENCOUNTER
Sent her the following message through [employer]:    I received a call today that you have been in need of a januvia refill and apparently your pharmacy has been requesting a doctor in [place]. I sent a 90 day supply with 3 refills to your [facility]. Take care.

## 2020-10-14 NOTE — TELEPHONE ENCOUNTER
----- Message from [place] sent at 10/14/2020  9:57 AM EDT -----  Regarding: Dr Rio Coats (if not patient):      Relationship of caller (if not patient):      Best contact number(s):[phone]      Name of medication and dosage if known:Januvia 100mg      Is patient out of this medication (yes/no):yes      Pharmacy name:[facility] in [place]  (new location)    Pharmacy listed in chart? (yes/no):no    Pharmacy phone number:[phone]      Details to clarify the request:pt said her pharmacy has been trying from one week from getting the rx from the wrong [facility], they did not realize Dr Tomas Torres was in [place], where she still gets her prescriptions from so she ran out       [place]

## 2020-11-05 LAB
EST. AVERAGE GLUCOSE BLD GHB EST-MCNC: 111 MG/DL
HBA1C MFR BLD: 5.5 % (ref 4.8–5.6)

## 2020-12-29 RX ORDER — ACARBOSE 25 MG/1
25 TABLET ORAL
Qty: 90 TAB | Refills: 3 | Status: SHIPPED | OUTPATIENT
Start: 2020-12-29

## 2021-03-27 DIAGNOSIS — R73.02 IMPAIRED GLUCOSE TOLERANCE: ICD-10-CM

## 2021-03-27 DIAGNOSIS — E83.52 HYPERCALCEMIA: ICD-10-CM

## 2021-03-27 DIAGNOSIS — E78.5 HYPERLIPIDEMIA LDL GOAL <100: ICD-10-CM

## 2021-03-27 DIAGNOSIS — R74.8 ELEVATED CK: ICD-10-CM

## 2021-04-01 ENCOUNTER — VIRTUAL VISIT (OUTPATIENT)
Dept: ENDOCRINOLOGY | Age: 61
End: 2021-04-01
Payer: COMMERCIAL

## 2021-04-01 DIAGNOSIS — R74.8 ELEVATED CK: ICD-10-CM

## 2021-04-01 DIAGNOSIS — E78.5 HYPERLIPIDEMIA LDL GOAL <100: ICD-10-CM

## 2021-04-01 DIAGNOSIS — R73.02 IMPAIRED GLUCOSE TOLERANCE: Primary | ICD-10-CM

## 2021-04-01 DIAGNOSIS — E83.52 HYPERCALCEMIA: ICD-10-CM

## 2021-04-01 PROCEDURE — 99214 OFFICE O/P EST MOD 30 MIN: CPT | Performed by: INTERNAL MEDICINE

## 2021-04-01 RX ORDER — FAMOTIDINE 10 MG/1
10 TABLET ORAL DAILY
COMMUNITY

## 2021-04-01 NOTE — PROGRESS NOTES
Chief Complaint   Patient presents with    Diabetes     [phone] doxy       **THIS IS A VIRTUAL VISIT VIA A VIDEO SYNCHRONOUS DISCUSSION. PATIENT AGREED TO HAVE THEIR CARE DELIVERED OVER A DOXY. ME VIDEO VISIT IN PLACE OF THEIR REGULARLY SCHEDULED OFFICE VISIT**    History of Present Illness: Jimbo Silva is a 56 y.o. female here for follow up of diabetes. No major change to health since last visit in 3/20. Has moved to [place] and is settled in the [place] area which is [place]. She contacted me in 12/20 about starting acarbose at lunch and this has helped with some of her spikes after this meal and has stayed on this dose. Still taking januvia and metformin once daily. Compliant with lipitor 5 mg daily. Just got her 1st COVID vaccine with Pfizer on 3/20/21 and had some diarrhea after this that has persisted. Just bought some Align yesterday. Has now established with a PCP in [place]. Current Outpatient Medications   Medication Sig    famotidine (Pepcid AC) 10 mg tablet Take 10 mg by mouth daily.  MAGNESIUM PO Take 250 mg by mouth daily.  acarbose (PRECOSE) 25 mg tablet Take 1 Tab by mouth daily (with lunch).  SITagliptin (Januvia) 100 mg tablet TAKE ONE TABLET BY MOUTH ONE TIME DAILY    metFORMIN ER (GLUCOPHAGE XR) 500 mg tablet TAKE ONE TABLEY BY MOUTH DAILY (WITH DINNER)    FreeStyle Mela 14 Day Sensor kit Use as directed every 14 days    aspirin 81 mg chewable tablet Take 81 mg by mouth daily.  CALCIUM CARB/VIT D2/MINERALS (CALCIUM CITRATE + PO) Take  by mouth daily. 2 tabs daily    valACYclovir (VALTREX) 1 gram tablet 500 mg daily.  Glucosamine &Chondroit-MV-Min3 869-581-25-0.5 mg tab Take  by mouth daily.  multivitamin (ONE A DAY) tablet Take 1 Tab by mouth daily.  cholecalciferol, vitamin D3, (VITAMIN D3) 2,000 unit tab Take  by mouth daily.  atorvastatin (LIPITOR) 10 mg tablet Take 5 mg by mouth daily.      No current facility-administered medications for this visit. No Known Allergies     Review of Systems: PER HPI    Physical Examination:  - GENERAL: NCAT, Appears well nourished   - EYES: EOMI, non-icteric, no proptosis   - Ear/Nose/Throat: NCAT, no visible inflammation or masses   - CARDIOVASCULAR: no cyanosis, no visible JVD   - RESPIRATORY: respiratory effort normal without any distress or labored breathing   - MUSCULOSKELETAL: Normal ROM of neck and upper extremities observed   - SKIN: No rash on face  - NEUROLOGIC:  No facial asymmetry (Cranial nerve 7 motor function), No gaze palsy   - PSYCHIATRIC: Normal affect, Normal insight and judgement       Data Reviewed: Nov 2020  - lipids: total 167,  HDL 85, TG 43, LDL 70  - ALT 20, AST 24  - BUN/Cr 21/0.71  - calcium 9.6      Assessment/Plan:     1. Impaired glucose tolerance: Has had mildly elevated A1c levels since early 2015 when it was 5.9% and then 5.5% in 8/15, 5.9% in 4/16 and 5.5% in 8/16. Had a 3 hour glucose tolerance tolerance test in 11/15 that showed a fasting sugar of 80, 1 hour of 121, 2 hour of 55 and 3 hour of 49. Her A1c was 5.6% at [address] when I met her in 11/16. It was unclear if she could be having intermittent spikes in her glucose that are contributing to her peripheral neuropathy so she started testing her sugars after eating and did identify spikes so has cut back to 30g of carbs or less per meal and 15-17g for snacks. Her A1c was down to 5.2% in 3/17 by POC and despite this, is still having numbness in her feet. Her A1c was 5.4% in 9/17 despite starting metformin 500 in 8/17 and continues to have symptoms in her feet. Still 5.4% in 12/17 and 3/18. Down to 5.3% in 9/18. Up to 5.5% in 3/19 and stable in 10/19 and 3/20 and 11/20 so won't make any changes. - cont januvia 100 mg daily  - cont metformin  mg at night  -  check A1c and cmp now (send to [facility])        2.  Hyperlipidemia LDL goal <100: LDL 68 and  in 11/16 while taking atorva 5 mg and she cut back to 2.5 mg daily to see if this would help her CK and it didn't and her LDL apparently went higher when checked by her cardiologist so she has gone back to 5 mg daily. LDL 79 and  in 9/17. LDL still 79 in 3/18 and . LDL 69 and  in 9/18. LDL 71 and  in 3/19. LDL 78 and  in 3/20. LDL 70 in 11/20  - cont atorva 5 mg daily  - check lipid profile now      3. Abnormal CK: this had been stable in the 200-400 range but was up to 427 in 9/17 with Dr. Leticia Hill but repeat down to 351 in 3/18 and rest as listed in lipid profile above  - check CK now    4. Hypercalcemia: level was 10.3 in 3/18 but down to 9.9 on repeat. I had her cut back from 3 to 2 calcium tabs per day to be safe and level was 10.2 in 9/18 and 9.6 in 3/19 and 10.0 in 3/20 and 9.6 in 11/20  - cont citracal 2 tabs per day  - follow on metabolic panels    Patient Instructions   1) Try holding the metformin and acarbose the next 4 days and taking the align to help with your looser stools. 2) I just e-mailed the lab slip for [facility].  It will come from an e-mail [email or web address] and all the e-mail will have is just an attached image with the PDF. Please confirm you received this and check your junk mail just in case you don't see it in your regular e-mail. Thanks. 3)  I will send you a message through [employer] with your lab results. 4) If you want to come back and see me once a year, let me know but if you just plan on following up with your PCP in [place] this is fine too. Follow-up and Dispositions    · Return if symptoms worsen or fail to improve. Copy sent to:  None at this time    Lab follow up: 5/9/21  - Hgb A1c 5.3%  - lipids: total 169,  HDL 73, TG 72, LDL 81  - ALT 16, AST 21  - BUN/Cr 21/0.79  -     Sent her the following message through [employer]:    Hemoglobin A1c is a 3 month marker of your blood sugar control.   Normal is less than 5.7% and diabetes is greater than 6.4%. Your Hemoglobin A1c is 5.3% which means your blood sugar is still in the normal range and under better control than last check when your value was 5.5%. Continue to work on your diet and exercise and take all your medications as directed.  -------------------------------------------------------------------------------------------------------------------  Total Cholesterol is the total number of cholesterol particles in your blood. Goal is less than 200. Yours is 169. Triglycerides are the short term fats in your blood. Goal is less than 150. Yours in 67. HDL is the good cholesterol in your blood. Goal is more than 50 if you are a woman and 40 if you are a man. Yours is 68. LDL is the bad cholesterol in your blood. Goal is less than 100 unless you have a history of heart disease or stroke and then goal is under 70. Yours is 81. Continue to follow a low cholesterol diet. Try to limit the amount of fried foods, fatty foods, butter, gravy, red meat, ice cream, cheese, and eggs in your diet, which are all high in cholesterol.  -------------------------------------------------------------------------------------------------------------------  BUN and creatinine are markers of kidney function. Your values are 21 and 0.79 which are normal.  -------------------------------------------------------------------------------------------------------------------  ALT and AST are markers of liver function. Your values are 16 and 21 which are normal.  -------------------------------------------------------------------------------------------------------------------  CK is a marker of muscle breakdown. Your value is 184 which has improved from 222.

## 2021-04-01 NOTE — PATIENT INSTRUCTIONS
1) Try holding the metformin and acarbose the next 4 days and taking the align to help with your looser stools. 2) I just e-mailed the lab slip for [facility].  It will come from an e-mail [email or web address] and all the e-mail will have is just an attached image with the PDF. Please confirm you received this and check your junk mail just in case you don't see it in your regular e-mail. Thanks. 3)  I will send you a message through [employer] with your lab results. 4) If you want to come back and see me once a year, let me know but if you just plan on following up with your PCP in [place] this is fine too.

## 2021-04-15 DIAGNOSIS — R73.02 IMPAIRED GLUCOSE TOLERANCE: ICD-10-CM

## 2021-04-15 RX ORDER — FLASH GLUCOSE SENSOR
KIT MISCELLANEOUS
Qty: 2 KIT | Refills: 11 | Status: SHIPPED | OUTPATIENT
Start: 2021-04-15

## 2021-05-04 LAB
CREATININE, EXTERNAL: 0.79
HBA1C MFR BLD HPLC: 5.3 %
LDL-C, EXTERNAL: 81

## 2022-03-19 PROBLEM — E83.52 HYPERCALCEMIA: Status: ACTIVE | Noted: 2018-03-30

## 2023-05-20 RX ORDER — ASPIRIN 81 MG/1
81 TABLET, CHEWABLE ORAL DAILY
COMMUNITY

## 2023-05-20 RX ORDER — ACARBOSE 25 MG/1
25 TABLET ORAL
COMMUNITY
Start: 2020-12-29

## 2023-05-20 RX ORDER — FAMOTIDINE 10 MG
10 TABLET ORAL DAILY
COMMUNITY

## 2023-05-20 RX ORDER — ATORVASTATIN CALCIUM 10 MG/1
5 TABLET, FILM COATED ORAL DAILY
COMMUNITY

## 2023-05-20 RX ORDER — VALACYCLOVIR HYDROCHLORIDE 1 G/1
500 TABLET, FILM COATED ORAL DAILY
COMMUNITY
Start: 2017-03-28

## 2023-05-20 RX ORDER — METFORMIN HYDROCHLORIDE 500 MG/1
TABLET, EXTENDED RELEASE ORAL
COMMUNITY
Start: 2020-07-06

## 2025-03-05 ENCOUNTER — TELEPHONE (OUTPATIENT)
Age: 65
End: 2025-03-05

## 2025-03-05 NOTE — TELEPHONE ENCOUNTER
Pt LVM stating she has moved back to [place] and would like to be seen by Dr Qureshi. She stated she was told she would be a new pt and the next available appt would be in December. She asked if Dr Qureshi would see her sooner.

## 2025-03-06 NOTE — TELEPHONE ENCOUNTER
Please ask her if she has established with a PCP and if not, she will need to do so.  Since I saw her last, due to our waiting list of over 1000 patients, we have made the decision to stop seeing patients for borderline diabetes, which is why I was seeing her previously and these patients will need to be managed by their PCP, to allow for patients with uncontrolled diabetes to have better access.  If she ever develops an A1c over 8% in the future, then I'm happy to see her back.  Thanks!